# Patient Record
Sex: MALE | Race: WHITE | Employment: OTHER | URBAN - METROPOLITAN AREA
[De-identification: names, ages, dates, MRNs, and addresses within clinical notes are randomized per-mention and may not be internally consistent; named-entity substitution may affect disease eponyms.]

---

## 2017-08-04 ENCOUNTER — HOSPITAL ENCOUNTER (OUTPATIENT)
Dept: GENERAL RADIOLOGY | Age: 77
Discharge: HOME | End: 2017-08-04
Attending: INTERNAL MEDICINE
Payer: MEDICARE

## 2017-08-04 DIAGNOSIS — M24.559: ICD-10-CM

## 2017-08-04 PROCEDURE — 73521 X-RAY EXAM HIPS BI 2 VIEWS: CPT | Performed by: INTERNAL MEDICINE

## 2019-10-29 ENCOUNTER — IMMUNIZATION (OUTPATIENT)
Dept: FAMILY MEDICINE CLINIC | Facility: CLINIC | Age: 79
End: 2019-10-29
Payer: COMMERCIAL

## 2019-10-29 DIAGNOSIS — Z23 NEED FOR VACCINATION: ICD-10-CM

## 2019-10-29 PROCEDURE — G0008 ADMIN INFLUENZA VIRUS VAC: HCPCS | Performed by: NURSE PRACTITIONER

## 2019-10-29 PROCEDURE — 90662 IIV NO PRSV INCREASED AG IM: CPT | Performed by: NURSE PRACTITIONER

## 2020-05-21 ENCOUNTER — OFFICE VISIT (OUTPATIENT)
Dept: ELECTROPHYSIOLOGY | Facility: HOSPITAL | Age: 80
End: 2020-05-21
Attending: INTERNAL MEDICINE
Payer: MEDICARE

## 2020-05-21 DIAGNOSIS — G60.3 IDIOPATHIC PROGRESSIVE NEUROPATHY: ICD-10-CM

## 2020-05-21 DIAGNOSIS — R20.0 NUMBNESS AND TINGLING IN BOTH HANDS: Primary | ICD-10-CM

## 2020-05-21 DIAGNOSIS — R20.2 NUMBNESS AND TINGLING IN BOTH HANDS: Primary | ICD-10-CM

## 2020-05-21 DIAGNOSIS — G61.9 INFLAMMATORY POLYNEUROPATHY (HCC): ICD-10-CM

## 2020-05-21 PROCEDURE — 95910 NRV CNDJ TEST 7-8 STUDIES: CPT | Performed by: OTHER

## 2020-05-21 PROCEDURE — 95886 MUSC TEST DONE W/N TEST COMP: CPT | Performed by: OTHER

## 2020-05-22 NOTE — PROCEDURES
[address]      PATIENT'S NAME: Magaly Madonna   REFERRING PHYSICIAN: Jasvir Walsh M.D.    PATIENT ACCOUNT #: [de-identified] LOCATION: [facility]   MEDICAL RECORD #: [id number] DATE OF BIRTH: [birth year]

## 2020-06-24 ENCOUNTER — HOSPITAL ENCOUNTER (OUTPATIENT)
Dept: MRI IMAGING | Age: 80
Discharge: HOME | End: 2020-06-24
Attending: INTERNAL MEDICINE
Payer: MEDICARE

## 2020-06-24 DIAGNOSIS — M50.122 CERVICAL DISC DISORDER AT C5-C6 LEVEL WITH RADICULOPATHY: ICD-10-CM

## 2020-06-24 DIAGNOSIS — G60.3 IDIOPATHIC PROGRESSIVE NEUROPATHY: ICD-10-CM

## 2020-06-24 DIAGNOSIS — S16.1XXD STRAIN OF MUSCLE, FASCIA AND TENDON AT NECK LEVEL, SUBSEQUENT ENCOUNTER: ICD-10-CM

## 2020-06-24 DIAGNOSIS — G61.9 INFLAMMATORY POLYNEUROPATHY, UNSPECIFIED (HCC): ICD-10-CM

## 2020-06-24 DIAGNOSIS — M50.123 CERVICAL DISC DISORDER AT C6-C7 LEVEL WITH RADICULOPATHY: ICD-10-CM

## 2020-06-24 PROCEDURE — 72141 MRI NECK SPINE W/O DYE: CPT | Performed by: INTERNAL MEDICINE

## 2020-07-29 ENCOUNTER — OFFICE VISIT (OUTPATIENT)
Dept: SURGERY | Facility: CLINIC | Age: 80
End: 2020-07-29
Payer: COMMERCIAL

## 2020-07-29 VITALS
HEIGHT: 72 IN | HEART RATE: 92 BPM | WEIGHT: 190 LBS | BODY MASS INDEX: 25.73 KG/M2 | SYSTOLIC BLOOD PRESSURE: 130 MMHG | DIASTOLIC BLOOD PRESSURE: 70 MMHG

## 2020-07-29 DIAGNOSIS — M48.02 STENOSIS OF CERVICAL SPINE WITH MYELOPATHY (HCC): ICD-10-CM

## 2020-07-29 DIAGNOSIS — R20.0 NUMBNESS AND TINGLING IN BOTH HANDS: Primary | ICD-10-CM

## 2020-07-29 DIAGNOSIS — R20.2 NUMBNESS AND TINGLING IN BOTH HANDS: Primary | ICD-10-CM

## 2020-07-29 DIAGNOSIS — G99.2 STENOSIS OF CERVICAL SPINE WITH MYELOPATHY (HCC): ICD-10-CM

## 2020-07-29 PROCEDURE — 99204 OFFICE O/P NEW MOD 45 MIN: CPT | Performed by: PHYSICIAN ASSISTANT

## 2020-07-29 PROCEDURE — 3008F BODY MASS INDEX DOCD: CPT | Performed by: PHYSICIAN ASSISTANT

## 2020-07-29 PROCEDURE — 3075F SYST BP GE 130 - 139MM HG: CPT | Performed by: PHYSICIAN ASSISTANT

## 2020-07-29 PROCEDURE — 3078F DIAST BP <80 MM HG: CPT | Performed by: PHYSICIAN ASSISTANT

## 2020-07-29 RX ORDER — ATORVASTATIN CALCIUM 10 MG/1
10 TABLET, FILM COATED ORAL DAILY
COMMUNITY

## 2020-07-29 NOTE — PROGRESS NOTES
Location of Pain: pt states no pain. No issues with B/B. Reduction of mobility in right hand. Pt states numbness in finger in bilateral hands. Pt states right is worse than left. No issues with tingling.    Date Pain Began: 2 years ago       Work Related:

## 2020-07-29 NOTE — PROGRESS NOTES
Patient: Sushila Marrero  Medical Record Number: [id number]    PCP: Eva Romano MD    HISTORY OF CHIEF COMPLAINT:    Sushila Marrero is a 67year old male, who complains of 2 years h/o slowly progressive hand weakness and numbness.  He notes numbness to Number of children: Not on file      Years of education: Not on file      Highest education level: Not on file    Tobacco Use      Smoking status: Former Smoker      Smokeless tobacco: Never Used      Tobacco comment: quit in 1978    Substance and Sexual A osteophyte complex at C5-C6 is obliterating the thecal sac and causing marked severe spinal canal stenosis with flattening of the cervical spinal cord. There is associated myelomalacia within the cervical spinal cord at C5-C6.      2. Moderate spinal canal NT   ABDOMINAL EXAM: Abdomen is soft, non-distended.   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 Thank you very much.    Respectfully yours,    Janae KUMARI

## 2020-08-03 ENCOUNTER — TELEPHONE (OUTPATIENT)
Dept: SURGERY | Facility: CLINIC | Age: 80
End: 2020-08-03

## 2020-08-03 DIAGNOSIS — M47.12 CERVICAL SPONDYLOSIS WITH MYELOPATHY: Primary | ICD-10-CM

## 2020-08-03 NOTE — TELEPHONE ENCOUNTER
You are scheduled for 2 level ACDF on 8/20/20 with Dr. Ashu Farfan. Pre-op instructions discussed with patient and surgical packet provided:    · You will need to contact the Pre-admission department at [phone] to schedule your pre-op testing.   Leidy Lindsey Steps to bathing with Hibiclens  Do not use Hibiclens on your hair, face or private areas  Wash your hair and face as normal with your usual cleansers  Rinse your body well to remove any shampoo from your skin  Step away from the shower stream  Using a virtual spine class is available online at [email or web address]. Please call the Care Coordinator, Gee Fofana, with any questions, at [phone].     PCP  PA:  [facility] Medicare

## 2020-08-04 NOTE — TELEPHONE ENCOUNTER
Spoke with patient who returned my call; patient chose surgery date of 8/20/20. Pre-op instructions and information given to patient during call, patient not active on MyChart, therefore instructions were printed and mailed to patient's home.      PCP

## 2020-08-04 NOTE — TELEPHONE ENCOUNTER
Routed to Dr. Hartmann Peach to complete \"Panel 1\" surgery details (laterality), and inquire about provider's proposed surgery date.

## 2020-08-04 NOTE — TELEPHONE ENCOUNTER
Surgery order placed through [facility], surgery date placed on [facility] calendar and mailed to \"Dr. Wen Vega List\". Routed to PA team for insurance authorization initiation and to [facility] to arrange 2 week post op appointment.

## 2020-08-05 RX ORDER — CHOLECALCIFEROL (VITAMIN D3) 50 MCG
1 TABLET ORAL DAILY
COMMUNITY

## 2020-08-05 RX ORDER — ASPIRIN 81 MG/1
81 TABLET ORAL DAILY
Status: ON HOLD | COMMUNITY
End: 2021-01-15

## 2020-08-05 RX ORDER — OMEGA-3-ACID ETHYL ESTERS 1 G/1
1 CAPSULE, LIQUID FILLED ORAL DAILY
Status: ON HOLD | COMMUNITY
End: 2020-09-25

## 2020-08-06 ENCOUNTER — APPOINTMENT (OUTPATIENT)
Dept: LAB | Age: 80
End: 2020-08-06
Attending: NEUROLOGICAL SURGERY
Payer: MEDICARE

## 2020-08-06 ENCOUNTER — LABORATORY ENCOUNTER (OUTPATIENT)
Dept: LAB | Age: 80
End: 2020-08-06
Attending: NEUROLOGICAL SURGERY
Payer: MEDICARE

## 2020-08-06 DIAGNOSIS — M47.12 CERVICAL SPONDYLOSIS WITH MYELOPATHY: ICD-10-CM

## 2020-08-06 LAB
ALBUMIN SERPL-MCNC: 3.6 G/DL (ref 3.4–5)
ALBUMIN/GLOB SERPL: 1 {RATIO} (ref 1–2)
ALP LIVER SERPL-CCNC: 81 U/L (ref 45–117)
ALT SERPL-CCNC: 28 U/L (ref 16–61)
ANION GAP SERPL CALC-SCNC: 1 MMOL/L (ref 0–18)
ANTIBODY SCREEN: NEGATIVE
APTT PPP: 29.5 SECONDS (ref 25.4–36.1)
AST SERPL-CCNC: 21 U/L (ref 15–37)
BASOPHILS # BLD AUTO: 0.12 X10(3) UL (ref 0–0.2)
BASOPHILS NFR BLD AUTO: 1.9 %
BILIRUB SERPL-MCNC: 1 MG/DL (ref 0.1–2)
BUN BLD-MCNC: 12 MG/DL (ref 7–18)
BUN/CREAT SERPL: 13.3 (ref 10–20)
CALCIUM BLD-MCNC: 9.3 MG/DL (ref 8.5–10.1)
CHLORIDE SERPL-SCNC: 109 MMOL/L (ref 98–112)
CO2 SERPL-SCNC: 30 MMOL/L (ref 21–32)
CREAT BLD-MCNC: 0.9 MG/DL (ref 0.7–1.3)
DEPRECATED RDW RBC AUTO: 48.3 FL (ref 35.1–46.3)
EOSINOPHIL # BLD AUTO: 0.17 X10(3) UL (ref 0–0.7)
EOSINOPHIL NFR BLD AUTO: 2.7 %
ERYTHROCYTE [DISTWIDTH] IN BLOOD BY AUTOMATED COUNT: 13.6 % (ref 11–15)
GLOBULIN PLAS-MCNC: 3.6 G/DL (ref 2.8–4.4)
GLUCOSE BLD-MCNC: 100 MG/DL (ref 70–99)
HCT VFR BLD AUTO: 45.1 % (ref 39–53)
HGB BLD-MCNC: 15 G/DL (ref 13–17.5)
IMM GRANULOCYTES # BLD AUTO: 0.02 X10(3) UL (ref 0–1)
IMM GRANULOCYTES NFR BLD: 0.3 %
INR BLD: 0.96 (ref 0.89–1.11)
LYMPHOCYTES # BLD AUTO: 2.09 X10(3) UL (ref 1–4)
LYMPHOCYTES NFR BLD AUTO: 33.3 %
M PROTEIN MFR SERPL ELPH: 7.2 G/DL (ref 6.4–8.2)
MCH RBC QN AUTO: 31.8 PG (ref 26–34)
MCHC RBC AUTO-ENTMCNC: 33.3 G/DL (ref 31–37)
MCV RBC AUTO: 95.6 FL (ref 80–100)
MONOCYTES # BLD AUTO: 0.69 X10(3) UL (ref 0.1–1)
MONOCYTES NFR BLD AUTO: 11 %
NEUTROPHILS # BLD AUTO: 3.18 X10 (3) UL (ref 1.5–7.7)
NEUTROPHILS # BLD AUTO: 3.18 X10(3) UL (ref 1.5–7.7)
NEUTROPHILS NFR BLD AUTO: 50.8 %
OSMOLALITY SERPL CALC.SUM OF ELEC: 290 MOSM/KG (ref 275–295)
PATIENT FASTING Y/N/NP: NO
PLATELET # BLD AUTO: 171 10(3)UL (ref 150–450)
POTASSIUM SERPL-SCNC: 4.4 MMOL/L (ref 3.5–5.1)
PSA SERPL DL<=0.01 NG/ML-MCNC: 13.1 SECONDS (ref 12.4–14.6)
RBC # BLD AUTO: 4.72 X10(6)UL (ref 3.8–5.8)
RH BLOOD TYPE: POSITIVE
SODIUM SERPL-SCNC: 140 MMOL/L (ref 136–145)
WBC # BLD AUTO: 6.3 X10(3) UL (ref 4–11)

## 2020-08-06 PROCEDURE — 86901 BLOOD TYPING SEROLOGIC RH(D): CPT

## 2020-08-06 PROCEDURE — 86850 RBC ANTIBODY SCREEN: CPT

## 2020-08-06 PROCEDURE — 93010 ELECTROCARDIOGRAM REPORT: CPT | Performed by: INTERNAL MEDICINE

## 2020-08-06 PROCEDURE — 87147 CULTURE TYPE IMMUNOLOGIC: CPT

## 2020-08-06 PROCEDURE — 36415 COLL VENOUS BLD VENIPUNCTURE: CPT

## 2020-08-06 PROCEDURE — 93005 ELECTROCARDIOGRAM TRACING: CPT

## 2020-08-06 PROCEDURE — 85610 PROTHROMBIN TIME: CPT

## 2020-08-06 PROCEDURE — 80053 COMPREHEN METABOLIC PANEL: CPT

## 2020-08-06 PROCEDURE — 87081 CULTURE SCREEN ONLY: CPT

## 2020-08-06 PROCEDURE — 85730 THROMBOPLASTIN TIME PARTIAL: CPT

## 2020-08-06 PROCEDURE — 86900 BLOOD TYPING SEROLOGIC ABO: CPT

## 2020-08-06 PROCEDURE — 85025 COMPLETE CBC W/AUTO DIFF WBC: CPT

## 2020-08-06 NOTE — TELEPHONE ENCOUNTER
Prior Authorization for outpatient surgery initiated with Riki Vinson at [place] [address]  Requesting coverage for: ACDF C4-5,C5-6  Date of Service: 8/20/2020   Inpatient days requested:0 - outpatient  CPT codes: 53060,83395,60385,24222,00310 - per Riki Vinson only 25

## 2020-08-07 LAB
ATRIAL RATE: 64 BPM
P AXIS: 4 DEGREES
P-R INTERVAL: 140 MS
Q-T INTERVAL: 422 MS
QRS DURATION: 114 MS
QTC CALCULATION (BEZET): 435 MS
R AXIS: -58 DEGREES
T AXIS: 59 DEGREES
VENTRICULAR RATE: 64 BPM

## 2020-08-13 ENCOUNTER — TELEPHONE (OUTPATIENT)
Dept: SURGERY | Facility: CLINIC | Age: 80
End: 2020-08-13

## 2020-08-13 NOTE — TELEPHONE ENCOUNTER
Pt thought that because of his nose culture being positive someone was going to order some type of ointment or medication. Please call.

## 2020-08-13 NOTE — TELEPHONE ENCOUNTER
Called patient to explain we do not have to treat for positive MSSA.   Patient happy for the phone call

## 2020-08-17 ENCOUNTER — LAB ENCOUNTER (OUTPATIENT)
Dept: LAB | Facility: HOSPITAL | Age: 80
End: 2020-08-17
Attending: NEUROLOGICAL SURGERY
Payer: MEDICARE

## 2020-08-17 DIAGNOSIS — M47.12 CERVICAL SPONDYLOSIS WITH MYELOPATHY: ICD-10-CM

## 2020-08-17 NOTE — TELEPHONE ENCOUNTER
Spoke to Dr Derik Bautista office nurse to inquire about pt's pre op clearance. Office requested pre op lab results be sent to their office at [phone]. Per RN pt may need an apt to obtain clearance for scheduled surgery on 8-20.   Faxed lab results to Dr Eugene Talbert

## 2020-08-18 LAB — SARS-COV-2 RNA RESP QL NAA+PROBE: NOT DETECTED

## 2020-08-18 NOTE — TELEPHONE ENCOUNTER
Prior authorization request completed for: ACDF C4-5,C5-6  Authorization #[id number] - outpatient  Authorization dates: 8/20/20-11/18/20  CPT codes approved: 38780,11970,40274,12576,81649  Number of visits/dates of service approved: 1  Physician: Dr Abimael Pritchard

## 2020-08-18 NOTE — TELEPHONE ENCOUNTER
Spoke to PCP Lior's office. Patient is scheduled to come in tomorrow, 8-19 for pre-op visit. Will await clearance notification. COVID pending.     EKG results from 8-7-2020  \"Normal sinus rhythm  Left anterior fascicular block  Minimal voltage criter

## 2020-08-19 ENCOUNTER — TELEPHONE (OUTPATIENT)
Dept: SURGERY | Facility: CLINIC | Age: 80
End: 2020-08-19

## 2020-08-20 ENCOUNTER — OFFICE VISIT (OUTPATIENT)
Dept: CARDIOLOGY | Age: 80
End: 2020-08-20

## 2020-08-20 ENCOUNTER — HOSPITAL ENCOUNTER (OUTPATIENT)
Facility: HOSPITAL | Age: 80
Setting detail: HOSPITAL OUTPATIENT SURGERY
Discharge: HOME | End: 2020-08-20
Attending: NEUROLOGICAL SURGERY | Admitting: NEUROLOGICAL SURGERY
Payer: MEDICARE

## 2020-08-20 ENCOUNTER — APPOINTMENT (OUTPATIENT)
Dept: GENERAL RADIOLOGY | Facility: HOSPITAL | Age: 80
End: 2020-08-20
Attending: NEUROLOGICAL SURGERY
Payer: MEDICARE

## 2020-08-20 VITALS
WEIGHT: 190 LBS | DIASTOLIC BLOOD PRESSURE: 88 MMHG | TEMPERATURE: 99 F | HEIGHT: 72 IN | HEART RATE: 66 BPM | RESPIRATION RATE: 18 BRPM | BODY MASS INDEX: 25.73 KG/M2 | OXYGEN SATURATION: 99 % | SYSTOLIC BLOOD PRESSURE: 109 MMHG

## 2020-08-20 VITALS
HEART RATE: 70 BPM | DIASTOLIC BLOOD PRESSURE: 68 MMHG | SYSTOLIC BLOOD PRESSURE: 138 MMHG | RESPIRATION RATE: 16 BRPM | BODY MASS INDEX: 25.87 KG/M2 | WEIGHT: 191 LBS | HEIGHT: 72 IN

## 2020-08-20 DIAGNOSIS — Z01.818 PRE-OP EVALUATION: ICD-10-CM

## 2020-08-20 DIAGNOSIS — M47.12 CERVICAL SPONDYLOSIS WITH MYELOPATHY: Primary | ICD-10-CM

## 2020-08-20 DIAGNOSIS — R94.31 ABNORMAL ELECTROCARDIOGRAM (ECG) (EKG): Primary | ICD-10-CM

## 2020-08-20 PROCEDURE — 99204 OFFICE O/P NEW MOD 45 MIN: CPT | Performed by: INTERNAL MEDICINE

## 2020-08-20 RX ORDER — SODIUM CHLORIDE, SODIUM LACTATE, POTASSIUM CHLORIDE, CALCIUM CHLORIDE 600; 310; 30; 20 MG/100ML; MG/100ML; MG/100ML; MG/100ML
INJECTION, SOLUTION INTRAVENOUS CONTINUOUS
Status: DISCONTINUED | OUTPATIENT
Start: 2020-08-20 | End: 2020-08-20

## 2020-08-20 RX ORDER — CEFAZOLIN SODIUM/WATER 2 G/20 ML
2 SYRINGE (ML) INTRAVENOUS ONCE
Status: DISCONTINUED | OUTPATIENT
Start: 2020-08-20 | End: 2020-08-20

## 2020-08-20 RX ORDER — ATORVASTATIN CALCIUM 10 MG/1
TABLET, FILM COATED ORAL
COMMUNITY
Start: 2020-06-03

## 2020-08-20 RX ORDER — ACETAMINOPHEN 500 MG
1000 TABLET ORAL ONCE
Status: DISCONTINUED | OUTPATIENT
Start: 2020-08-20 | End: 2020-08-20

## 2020-08-20 SDOH — HEALTH STABILITY: PHYSICAL HEALTH: ON AVERAGE, HOW MANY DAYS PER WEEK DO YOU ENGAGE IN MODERATE TO STRENUOUS EXERCISE (LIKE A BRISK WALK)?: 0 DAYS

## 2020-08-20 ASSESSMENT — ENCOUNTER SYMPTOMS
HEMATOCHEZIA: 0
SUSPICIOUS LESIONS: 0
ENDOCRINE NEGATIVE: 1
FEVER: 0
SYNCOPE: 0
WEIGHT LOSS: 0
LIGHT-HEADEDNESS: 0
CHILLS: 0
SHORTNESS OF BREATH: 0
WEIGHT GAIN: 0
COUGH: 0
DIZZINESS: 0
ABDOMINAL PAIN: 0
PSYCHIATRIC NEGATIVE: 1
HEMOPTYSIS: 0
BRUISES/BLEEDS EASILY: 0

## 2020-08-20 ASSESSMENT — PATIENT HEALTH QUESTIONNAIRE - PHQ9
CLINICAL INTERPRETATION OF PHQ2 SCORE: NO FURTHER SCREENING NEEDED
2. FEELING DOWN, DEPRESSED OR HOPELESS: NOT AT ALL
SUM OF ALL RESPONSES TO PHQ9 QUESTIONS 1 AND 2: 0
CLINICAL INTERPRETATION OF PHQ9 SCORE: NO FURTHER SCREENING NEEDED
SUM OF ALL RESPONSES TO PHQ9 QUESTIONS 1 AND 2: 0
1. LITTLE INTEREST OR PLEASURE IN DOING THINGS: NOT AT ALL

## 2020-08-20 NOTE — H&P
[facility]  Neurological Surgery History and Physical    Akash Chavarria, 67year old male Patient Status:  Outpatient in a Bed    1940 MRN [id number]   Location [address] [place] [address] Attending Jaylene Rooney MD intraoperative cholangiogram             Family History   Problem Relation Age of Onset   • Other (skin cancer [Other]) Father         Social History    Socioeconomic History      Marital status: Single      Spouse name: Not on file      Number of children stenosis. C7-T1:  Mild central disc bulge without spinal canal stenosis.     CRANIOCERVICAL AREA:  Normal foramen magnum with no Chiari malformation.    PARASPINAL AREA:  Normal with no visible mass.          CONCLUSION:       1.  Large posterior disc oste EHL                     5/5 4/5                                     Patella                3+/4       3+/4  DF                       5/5 4/5                                     Achilles               3+/4       3 he has had symptoms for 2 years, surgery would be to arrest progression of his symptoms but would not guarantee resolution or significant improvement. He understands.  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

## 2020-08-21 ENCOUNTER — TELEPHONE (OUTPATIENT)
Dept: CARDIOLOGY | Age: 80
End: 2020-08-21

## 2020-08-21 ENCOUNTER — ANCILLARY PROCEDURE (OUTPATIENT)
Dept: CARDIOLOGY | Age: 80
End: 2020-08-21
Attending: INTERNAL MEDICINE

## 2020-08-21 VITALS — BODY MASS INDEX: 25.87 KG/M2 | WEIGHT: 191 LBS | HEIGHT: 72 IN

## 2020-08-21 DIAGNOSIS — Z01.818 PRE-OP EVALUATION: ICD-10-CM

## 2020-08-21 DIAGNOSIS — R94.31 ABNORMAL ELECTROCARDIOGRAM (ECG) (EKG): ICD-10-CM

## 2020-08-21 LAB
HEART RATE RESERVE PREDICTED: 49.3 BPM
LV EF: 54 %
PEAK HR ACHIEVED: 72 BPM
RESTING HR ACHIEVED: 61 BPM
STRESS BASELINE BP: NORMAL MMHG
STRESS PERCENT HR: 51 %
STRESS POST EXERCISE DUR MIN: 1 MIN
STRESS POST PEAK BP: NORMAL MMHG
STRESS TARGET HR: 142 BPM

## 2020-08-21 PROCEDURE — 78452 HT MUSCLE IMAGE SPECT MULT: CPT | Performed by: INTERNAL MEDICINE

## 2020-08-21 PROCEDURE — 93015 CV STRESS TEST SUPVJ I&R: CPT | Performed by: INTERNAL MEDICINE

## 2020-08-21 PROCEDURE — X1094 NO CHARGE VISIT: HCPCS | Performed by: INTERNAL MEDICINE

## 2020-08-21 PROCEDURE — A9502 TC99M TETROFOSMIN: HCPCS | Performed by: INTERNAL MEDICINE

## 2020-08-21 RX ORDER — REGADENOSON 0.08 MG/ML
0.4 INJECTION, SOLUTION INTRAVENOUS ONCE
Status: COMPLETED | OUTPATIENT
Start: 2020-08-21 | End: 2020-08-21

## 2020-08-21 RX ADMIN — REGADENOSON 0.4 MG: 0.08 INJECTION, SOLUTION INTRAVENOUS at 08:40

## 2020-08-21 ASSESSMENT — EXERCISE STRESS TEST
PEAK_RPP: 8960
PEAK_RPP: 9240
PEAK_RPP: 9230
PEAK_RPP: 8520
PEAK_HR: 70
PEAK_BP: 128/60
PEAK_BP: 130/60
PEAK_HR: 61
PEAK_HR: 77
PEAK_BP: 120/60
PEAK_HR: 71
PEAK_BP: 148/64
PEAK_HR: 71
COMMENTS: REGADENOSON INJECTED
STAGE_CATEGORIES: RESTING
STAGE_CATEGORIES: RECOVERY 0
STAGE_CATEGORIES: RECOVERY 1
PEAK_BP: 120/60
PEAK_RPP: 9028
STAGE_CATEGORIES: 1
STAGE_CATEGORIES: RECOVERY 2
STOPPAGE_REASON: PROTOCOL COMPLETE

## 2020-08-24 ENCOUNTER — TELEPHONE (OUTPATIENT)
Dept: SURGERY | Facility: CLINIC | Age: 80
End: 2020-08-24

## 2020-08-25 ENCOUNTER — TELEPHONE (OUTPATIENT)
Dept: CARDIOLOGY | Age: 80
End: 2020-08-25

## 2020-08-25 NOTE — TELEPHONE ENCOUNTER
Spoke with cards-'s office and informed them that stress test was received yesterday, but we are needing a note giving cardiology clearance so surgery can be scheduled.     A message is being sent back to 's clinical staff to a

## 2020-08-25 NOTE — TELEPHONE ENCOUNTER
Stress test results received from [facility]. Whether the patient has been cleared or not is not included in the fax received. Will send another fax requesting cardiac clearance to Herberth Griffiths at [phone]. Awaiting response.

## 2020-08-26 NOTE — TELEPHONE ENCOUNTER
Received fax from cardiologist Rodena Rinne stating \"patient may proceed with surgery at an acceptable risk\". Faxed note to Dr Neela Martinez office PCP requesting clearance. Confirmation received.

## 2020-08-27 ENCOUNTER — TELEPHONE (OUTPATIENT)
Dept: SURGERY | Facility: CLINIC | Age: 80
End: 2020-08-27

## 2020-08-27 DIAGNOSIS — M47.12 CERVICAL SPONDYLOSIS WITH MYELOPATHY AND RADICULOPATHY: Primary | ICD-10-CM

## 2020-08-27 DIAGNOSIS — M47.22 CERVICAL SPONDYLOSIS WITH MYELOPATHY AND RADICULOPATHY: Primary | ICD-10-CM

## 2020-08-27 NOTE — TELEPHONE ENCOUNTER
Awaiting final PCP clearance. Will also need  to place a new surgery order as previous surgery date was cancelled.

## 2020-08-28 ENCOUNTER — TELEPHONE (OUTPATIENT)
Dept: SURGERY | Facility: CLINIC | Age: 80
End: 2020-08-28

## 2020-09-01 NOTE — TELEPHONE ENCOUNTER
Received another copy of office visit notes from patient's last apt with Dr Simeon Peabody on 8-19-20. Faxed back with letter again requesting if patient can be cleared for surgery since he was cleared by cardiology. 2nd request.  Confirmation received.

## 2020-09-02 ENCOUNTER — TELEPHONE (OUTPATIENT)
Dept: SURGERY | Facility: CLINIC | Age: 80
End: 2020-09-02

## 2020-09-02 NOTE — TELEPHONE ENCOUNTER
Spoke with Alexandria Sommer at [place] [address]  Reference #:[id number]   Time:06:15    Confirmed with Alexandria Sommer that Authorization date range is still valid.  (See Below)      Prior authorization request completed for: ACDF C4-5,C5-6  Authorization #[id number] - outpatient  Auth

## 2020-09-02 NOTE — TELEPHONE ENCOUNTER
Spoke to patient to reschedule surgery. Surgery scheduled for 9-24. Discussed pre-op instructions again just to confirm he knew how to prep--NPO after 11pm on 9-23, gatorade and tylenol instructions, hibiclens. Patient verbalized understanding.      PA

## 2020-09-03 ENCOUNTER — TELEPHONE (OUTPATIENT)
Dept: SURGERY | Facility: CLINIC | Age: 80
End: 2020-09-03

## 2020-09-03 DIAGNOSIS — M47.812 CERVICAL SPONDYLOSIS: Primary | ICD-10-CM

## 2020-09-03 NOTE — PAT NURSING NOTE
Called surgeons office and s/w Priya shaw MD is requesting PTT and will fax order and also stress test to us.

## 2020-09-03 NOTE — TELEPHONE ENCOUNTER
PAT requested order for PTT and stress test results. Stress test not scanned in yet. Called Dr Self Class office, will send results via fax. Awaiting fax.   PTT order placed as case change request.

## 2020-09-11 NOTE — TELEPHONE ENCOUNTER
Received office visit notes from Dr Renae Neely on 8-20. Stress test results not included as requested. Called office again and requested stress test results be faxed to us. Awaiting fax.

## 2020-09-14 ENCOUNTER — ANESTHESIA EVENT (OUTPATIENT)
Dept: SURGERY | Facility: HOSPITAL | Age: 80
End: 2020-09-14
Payer: MEDICARE

## 2020-09-21 ENCOUNTER — LAB ENCOUNTER (OUTPATIENT)
Dept: LAB | Age: 80
End: 2020-09-21
Attending: NEUROLOGICAL SURGERY
Payer: MEDICARE

## 2020-09-21 DIAGNOSIS — M47.12 SPONDYLOSIS OF CERVICAL SPINE WITH MYELOPATHY AND RADICULOPATHY: ICD-10-CM

## 2020-09-21 DIAGNOSIS — M47.22 SPONDYLOSIS OF CERVICAL SPINE WITH MYELOPATHY AND RADICULOPATHY: ICD-10-CM

## 2020-09-21 LAB
ANTIBODY SCREEN: NEGATIVE
APTT PPP: 32 SECONDS (ref 25.4–36.1)
INR BLD: 0.96 (ref 0.89–1.11)
PSA SERPL DL<=0.01 NG/ML-MCNC: 13.1 SECONDS (ref 12.4–14.6)
RH BLOOD TYPE: POSITIVE

## 2020-09-21 PROCEDURE — 36415 COLL VENOUS BLD VENIPUNCTURE: CPT

## 2020-09-21 PROCEDURE — 86900 BLOOD TYPING SEROLOGIC ABO: CPT

## 2020-09-21 PROCEDURE — 85730 THROMBOPLASTIN TIME PARTIAL: CPT

## 2020-09-21 PROCEDURE — 86850 RBC ANTIBODY SCREEN: CPT

## 2020-09-21 PROCEDURE — 87081 CULTURE SCREEN ONLY: CPT

## 2020-09-21 PROCEDURE — 86901 BLOOD TYPING SEROLOGIC RH(D): CPT

## 2020-09-21 PROCEDURE — 85610 PROTHROMBIN TIME: CPT

## 2020-09-23 LAB — SARS-COV-2 RNA RESP QL NAA+PROBE: NOT DETECTED

## 2020-09-24 ENCOUNTER — ANESTHESIA (OUTPATIENT)
Dept: SURGERY | Facility: HOSPITAL | Age: 80
End: 2020-09-24
Payer: MEDICARE

## 2020-09-24 ENCOUNTER — HOSPITAL ENCOUNTER (OUTPATIENT)
Facility: HOSPITAL | Age: 80
Discharge: HOME | End: 2020-09-25
Attending: NEUROLOGICAL SURGERY | Admitting: NEUROLOGICAL SURGERY
Payer: MEDICARE

## 2020-09-24 ENCOUNTER — APPOINTMENT (OUTPATIENT)
Dept: GENERAL RADIOLOGY | Facility: HOSPITAL | Age: 80
End: 2020-09-24
Attending: NEUROLOGICAL SURGERY
Payer: MEDICARE

## 2020-09-24 DIAGNOSIS — M47.22 CERVICAL SPONDYLOSIS WITH MYELOPATHY AND RADICULOPATHY: ICD-10-CM

## 2020-09-24 DIAGNOSIS — M47.22 SPONDYLOSIS OF CERVICAL SPINE WITH MYELOPATHY AND RADICULOPATHY: Primary | ICD-10-CM

## 2020-09-24 DIAGNOSIS — M47.12 SPONDYLOSIS OF CERVICAL SPINE WITH MYELOPATHY AND RADICULOPATHY: Primary | ICD-10-CM

## 2020-09-24 DIAGNOSIS — M47.12 CERVICAL SPONDYLOSIS WITH MYELOPATHY AND RADICULOPATHY: ICD-10-CM

## 2020-09-24 PROCEDURE — 95939 C MOTOR EVOKED UPR&LWR LIMBS: CPT | Performed by: NEUROLOGICAL SURGERY

## 2020-09-24 PROCEDURE — 95938 SOMATOSENSORY TESTING: CPT | Performed by: NEUROLOGICAL SURGERY

## 2020-09-24 PROCEDURE — 95940 IONM IN OPERATNG ROOM 15 MIN: CPT | Performed by: NEUROLOGICAL SURGERY

## 2020-09-24 PROCEDURE — 0RB30ZZ EXCISION OF CERVICAL VERTEBRAL DISC, OPEN APPROACH: ICD-10-PCS | Performed by: NEUROLOGICAL SURGERY

## 2020-09-24 PROCEDURE — 96375 TX/PRO/DX INJ NEW DRUG ADDON: CPT

## 2020-09-24 PROCEDURE — 0RG20A0 FUSION OF 2 OR MORE CERVICAL VERTEBRAL JOINTS WITH INTERBODY FUSION DEVICE, ANTERIOR APPROACH, ANTERIOR COLUMN, OPEN APPROACH: ICD-10-PCS | Performed by: NEUROLOGICAL SURGERY

## 2020-09-24 PROCEDURE — 95870 NDL EMG LMTD STD MUSC 1 XTR: CPT | Performed by: NEUROLOGICAL SURGERY

## 2020-09-24 PROCEDURE — 4A10X4G MONITORING OF CENTRAL NERVOUS ELECTRICAL ACTIVITY, INTRAOPERATIVE, EXTERNAL APPROACH: ICD-10-PCS | Performed by: NEUROLOGICAL SURGERY

## 2020-09-24 PROCEDURE — 76000 FLUOROSCOPY <1 HR PHYS/QHP: CPT | Performed by: NEUROLOGICAL SURGERY

## 2020-09-24 DEVICE — GRAFT BONE VIVIGEN SM CRYO: Type: IMPLANTABLE DEVICE | Site: NECK | Status: FUNCTIONAL

## 2020-09-24 DEVICE — UNIPLATE 2 ANTERIOR CERVICAL PLATE SYSTEM TWO LEVEL PLATE 30MM
Type: IMPLANTABLE DEVICE | Site: NECK | Status: FUNCTIONAL
Brand: UNIPLATE

## 2020-09-24 DEVICE — UNIPLATE ANTERIOR CERVICAL PLATE SYSTEM SCREW, SELF-TAPPING 4.6 X 14MM
Type: IMPLANTABLE DEVICE | Site: NECK | Status: FUNCTIONAL
Brand: UNIPLATE

## 2020-09-24 RX ORDER — HYDROMORPHONE HYDROCHLORIDE 1 MG/ML
0.4 INJECTION, SOLUTION INTRAMUSCULAR; INTRAVENOUS; SUBCUTANEOUS EVERY 5 MIN PRN
Status: DISCONTINUED | OUTPATIENT
Start: 2020-09-24 | End: 2020-09-24 | Stop reason: HOSPADM

## 2020-09-24 RX ORDER — ONDANSETRON 2 MG/ML
4 INJECTION INTRAMUSCULAR; INTRAVENOUS AS NEEDED
Status: DISCONTINUED | OUTPATIENT
Start: 2020-09-24 | End: 2020-09-24 | Stop reason: HOSPADM

## 2020-09-24 RX ORDER — MEPERIDINE HYDROCHLORIDE 25 MG/ML
12.5 INJECTION INTRAMUSCULAR; INTRAVENOUS; SUBCUTANEOUS AS NEEDED
Status: DISCONTINUED | OUTPATIENT
Start: 2020-09-24 | End: 2020-09-24 | Stop reason: HOSPADM

## 2020-09-24 RX ORDER — DIPHENHYDRAMINE HYDROCHLORIDE 50 MG/ML
25 INJECTION INTRAMUSCULAR; INTRAVENOUS EVERY 4 HOURS PRN
Status: DISCONTINUED | OUTPATIENT
Start: 2020-09-24 | End: 2020-09-25

## 2020-09-24 RX ORDER — DEXTROSE, SODIUM CHLORIDE, AND POTASSIUM CHLORIDE 5; .45; .15 G/100ML; G/100ML; G/100ML
INJECTION INTRAVENOUS
Status: COMPLETED
Start: 2020-09-24 | End: 2020-09-24

## 2020-09-24 RX ORDER — HYDROMORPHONE HYDROCHLORIDE 1 MG/ML
INJECTION, SOLUTION INTRAMUSCULAR; INTRAVENOUS; SUBCUTANEOUS
Status: COMPLETED
Start: 2020-09-24 | End: 2020-09-24

## 2020-09-24 RX ORDER — HYDROCODONE BITARTRATE AND ACETAMINOPHEN 10; 325 MG/1; MG/1
2 TABLET ORAL EVERY 4 HOURS PRN
Status: DISCONTINUED | OUTPATIENT
Start: 2020-09-24 | End: 2020-09-25

## 2020-09-24 RX ORDER — DEXAMETHASONE SODIUM PHOSPHATE 10 MG/ML
10 INJECTION, SOLUTION INTRAMUSCULAR; INTRAVENOUS EVERY 6 HOURS
Status: COMPLETED | OUTPATIENT
Start: 2020-09-24 | End: 2020-09-25

## 2020-09-24 RX ORDER — ACETAMINOPHEN 10 MG/ML
INJECTION, SOLUTION INTRAVENOUS AS NEEDED
Status: DISCONTINUED | OUTPATIENT
Start: 2020-09-24 | End: 2020-09-24 | Stop reason: SURG

## 2020-09-24 RX ORDER — POLYETHYLENE GLYCOL 3350 17 G/17G
17 POWDER, FOR SOLUTION ORAL DAILY PRN
Status: DISCONTINUED | OUTPATIENT
Start: 2020-09-24 | End: 2020-09-25

## 2020-09-24 RX ORDER — OMEGA-3-ACID ETHYL ESTERS 1 G/1
1 CAPSULE, LIQUID FILLED ORAL DAILY
Status: DISCONTINUED | OUTPATIENT
Start: 2020-09-25 | End: 2020-09-24 | Stop reason: ALTCHOICE

## 2020-09-24 RX ORDER — CHOLECALCIFEROL (VITAMIN D3) 50 MCG
1 TABLET ORAL DAILY
Status: DISCONTINUED | OUTPATIENT
Start: 2020-09-24 | End: 2020-09-24

## 2020-09-24 RX ORDER — DIPHENHYDRAMINE HYDROCHLORIDE 50 MG/ML
12.5 INJECTION INTRAMUSCULAR; INTRAVENOUS AS NEEDED
Status: DISCONTINUED | OUTPATIENT
Start: 2020-09-24 | End: 2020-09-24 | Stop reason: HOSPADM

## 2020-09-24 RX ORDER — ATORVASTATIN CALCIUM 10 MG/1
10 TABLET, FILM COATED ORAL DAILY
Status: DISCONTINUED | OUTPATIENT
Start: 2020-09-24 | End: 2020-09-24

## 2020-09-24 RX ORDER — MORPHINE SULFATE 2 MG/ML
1 INJECTION, SOLUTION INTRAMUSCULAR; INTRAVENOUS EVERY 2 HOUR PRN
Status: DISCONTINUED | OUTPATIENT
Start: 2020-09-24 | End: 2020-09-25

## 2020-09-24 RX ORDER — ROCURONIUM BROMIDE 10 MG/ML
INJECTION, SOLUTION INTRAVENOUS AS NEEDED
Status: DISCONTINUED | OUTPATIENT
Start: 2020-09-24 | End: 2020-09-24 | Stop reason: SURG

## 2020-09-24 RX ORDER — DEXTROSE, SODIUM CHLORIDE, AND POTASSIUM CHLORIDE 5; .45; .15 G/100ML; G/100ML; G/100ML
INJECTION INTRAVENOUS CONTINUOUS
Status: DISCONTINUED | OUTPATIENT
Start: 2020-09-24 | End: 2020-09-25

## 2020-09-24 RX ORDER — NALOXONE HYDROCHLORIDE 0.4 MG/ML
80 INJECTION, SOLUTION INTRAMUSCULAR; INTRAVENOUS; SUBCUTANEOUS AS NEEDED
Status: DISCONTINUED | OUTPATIENT
Start: 2020-09-24 | End: 2020-09-24 | Stop reason: HOSPADM

## 2020-09-24 RX ORDER — HYDROCODONE BITARTRATE AND ACETAMINOPHEN 5; 325 MG/1; MG/1
1 TABLET ORAL AS NEEDED
Status: DISCONTINUED | OUTPATIENT
Start: 2020-09-24 | End: 2020-09-24 | Stop reason: HOSPADM

## 2020-09-24 RX ORDER — PROCHLORPERAZINE EDISYLATE 5 MG/ML
10 INJECTION INTRAMUSCULAR; INTRAVENOUS EVERY 6 HOURS PRN
Status: DISCONTINUED | OUTPATIENT
Start: 2020-09-24 | End: 2020-09-25

## 2020-09-24 RX ORDER — DEXAMETHASONE SODIUM PHOSPHATE 4 MG/ML
4 VIAL (ML) INJECTION EVERY 6 HOURS
Status: DISCONTINUED | OUTPATIENT
Start: 2020-09-24 | End: 2020-09-24

## 2020-09-24 RX ORDER — HYDROCODONE BITARTRATE AND ACETAMINOPHEN 10; 325 MG/1; MG/1
1 TABLET ORAL EVERY 4 HOURS PRN
Status: DISCONTINUED | OUTPATIENT
Start: 2020-09-24 | End: 2020-09-25

## 2020-09-24 RX ORDER — METOCLOPRAMIDE HYDROCHLORIDE 5 MG/ML
10 INJECTION INTRAMUSCULAR; INTRAVENOUS AS NEEDED
Status: DISCONTINUED | OUTPATIENT
Start: 2020-09-24 | End: 2020-09-24 | Stop reason: HOSPADM

## 2020-09-24 RX ORDER — SODIUM PHOSPHATE, DIBASIC AND SODIUM PHOSPHATE, MONOBASIC 7; 19 G/133ML; G/133ML
1 ENEMA RECTAL ONCE AS NEEDED
Status: DISCONTINUED | OUTPATIENT
Start: 2020-09-24 | End: 2020-09-25

## 2020-09-24 RX ORDER — SENNOSIDES 8.6 MG
17.2 TABLET ORAL NIGHTLY
Status: DISCONTINUED | OUTPATIENT
Start: 2020-09-24 | End: 2020-09-25

## 2020-09-24 RX ORDER — HYDROCODONE BITARTRATE AND ACETAMINOPHEN 5; 325 MG/1; MG/1
2 TABLET ORAL AS NEEDED
Status: DISCONTINUED | OUTPATIENT
Start: 2020-09-24 | End: 2020-09-24 | Stop reason: HOSPADM

## 2020-09-24 RX ORDER — DIAZEPAM 5 MG/ML
5 INJECTION, SOLUTION INTRAMUSCULAR; INTRAVENOUS EVERY 8 HOURS PRN
Status: DISCONTINUED | OUTPATIENT
Start: 2020-09-24 | End: 2020-09-25

## 2020-09-24 RX ORDER — BUPIVACAINE HYDROCHLORIDE AND EPINEPHRINE 5; 5 MG/ML; UG/ML
INJECTION, SOLUTION EPIDURAL; INTRACAUDAL; PERINEURAL AS NEEDED
Status: DISCONTINUED | OUTPATIENT
Start: 2020-09-24 | End: 2020-09-24 | Stop reason: HOSPADM

## 2020-09-24 RX ORDER — LIDOCAINE HYDROCHLORIDE 40 MG/ML
INJECTION, SOLUTION RETROBULBAR; TOPICAL AS NEEDED
Status: DISCONTINUED | OUTPATIENT
Start: 2020-09-24 | End: 2020-09-24 | Stop reason: SURG

## 2020-09-24 RX ORDER — ATORVASTATIN CALCIUM 10 MG/1
10 TABLET, FILM COATED ORAL DAILY
Status: DISCONTINUED | OUTPATIENT
Start: 2020-09-25 | End: 2020-09-25

## 2020-09-24 RX ORDER — CEFAZOLIN SODIUM/WATER 2 G/20 ML
2 SYRINGE (ML) INTRAVENOUS ONCE
Status: COMPLETED | OUTPATIENT
Start: 2020-09-24 | End: 2020-09-24

## 2020-09-24 RX ORDER — BISACODYL 10 MG
10 SUPPOSITORY, RECTAL RECTAL
Status: DISCONTINUED | OUTPATIENT
Start: 2020-09-24 | End: 2020-09-25

## 2020-09-24 RX ORDER — DEXAMETHASONE SODIUM PHOSPHATE 4 MG/ML
VIAL (ML) INJECTION AS NEEDED
Status: DISCONTINUED | OUTPATIENT
Start: 2020-09-24 | End: 2020-09-24 | Stop reason: SURG

## 2020-09-24 RX ORDER — LABETALOL HYDROCHLORIDE 5 MG/ML
5 INJECTION, SOLUTION INTRAVENOUS EVERY 5 MIN PRN
Status: DISCONTINUED | OUTPATIENT
Start: 2020-09-24 | End: 2020-09-24 | Stop reason: HOSPADM

## 2020-09-24 RX ORDER — OMEGA-3-ACID ETHYL ESTERS 1 G/1
1 CAPSULE, LIQUID FILLED ORAL DAILY
Status: DISCONTINUED | OUTPATIENT
Start: 2020-09-24 | End: 2020-09-24

## 2020-09-24 RX ORDER — MORPHINE SULFATE 4 MG/ML
4 INJECTION, SOLUTION INTRAMUSCULAR; INTRAVENOUS EVERY 2 HOUR PRN
Status: DISCONTINUED | OUTPATIENT
Start: 2020-09-24 | End: 2020-09-25

## 2020-09-24 RX ORDER — SODIUM CHLORIDE, SODIUM LACTATE, POTASSIUM CHLORIDE, CALCIUM CHLORIDE 600; 310; 30; 20 MG/100ML; MG/100ML; MG/100ML; MG/100ML
INJECTION, SOLUTION INTRAVENOUS CONTINUOUS
Status: DISCONTINUED | OUTPATIENT
Start: 2020-09-24 | End: 2020-09-25

## 2020-09-24 RX ORDER — DOCUSATE SODIUM 100 MG/1
100 CAPSULE, LIQUID FILLED ORAL 2 TIMES DAILY
Status: DISCONTINUED | OUTPATIENT
Start: 2020-09-24 | End: 2020-09-25

## 2020-09-24 RX ORDER — ACETAMINOPHEN 500 MG
1000 TABLET ORAL ONCE
Status: DISCONTINUED | OUTPATIENT
Start: 2020-09-24 | End: 2020-09-24 | Stop reason: HOSPADM

## 2020-09-24 RX ORDER — MIDAZOLAM HYDROCHLORIDE 1 MG/ML
INJECTION INTRAMUSCULAR; INTRAVENOUS AS NEEDED
Status: DISCONTINUED | OUTPATIENT
Start: 2020-09-24 | End: 2020-09-24 | Stop reason: SURG

## 2020-09-24 RX ORDER — METOCLOPRAMIDE HYDROCHLORIDE 5 MG/ML
10 INJECTION INTRAMUSCULAR; INTRAVENOUS EVERY 6 HOURS PRN
Status: DISCONTINUED | OUTPATIENT
Start: 2020-09-24 | End: 2020-09-25

## 2020-09-24 RX ORDER — SODIUM CHLORIDE, SODIUM LACTATE, POTASSIUM CHLORIDE, CALCIUM CHLORIDE 600; 310; 30; 20 MG/100ML; MG/100ML; MG/100ML; MG/100ML
INJECTION, SOLUTION INTRAVENOUS CONTINUOUS
Status: DISCONTINUED | OUTPATIENT
Start: 2020-09-24 | End: 2020-09-24 | Stop reason: HOSPADM

## 2020-09-24 RX ORDER — EPHEDRINE SULFATE 50 MG/ML
INJECTION, SOLUTION INTRAVENOUS AS NEEDED
Status: DISCONTINUED | OUTPATIENT
Start: 2020-09-24 | End: 2020-09-24 | Stop reason: SURG

## 2020-09-24 RX ORDER — BACITRACIN 50000 [USP'U]/1
INJECTION, POWDER, LYOPHILIZED, FOR SOLUTION INTRAMUSCULAR AS NEEDED
Status: DISCONTINUED | OUTPATIENT
Start: 2020-09-24 | End: 2020-09-24 | Stop reason: HOSPADM

## 2020-09-24 RX ORDER — ONDANSETRON 2 MG/ML
INJECTION INTRAMUSCULAR; INTRAVENOUS AS NEEDED
Status: DISCONTINUED | OUTPATIENT
Start: 2020-09-24 | End: 2020-09-24 | Stop reason: SURG

## 2020-09-24 RX ORDER — UBIDECARENONE 100 MG
CAPSULE ORAL DAILY
Status: DISCONTINUED | OUTPATIENT
Start: 2020-09-24 | End: 2020-09-24 | Stop reason: RX

## 2020-09-24 RX ORDER — MORPHINE SULFATE 2 MG/ML
2 INJECTION, SOLUTION INTRAMUSCULAR; INTRAVENOUS EVERY 2 HOUR PRN
Status: DISCONTINUED | OUTPATIENT
Start: 2020-09-24 | End: 2020-09-25

## 2020-09-24 RX ORDER — ONDANSETRON 2 MG/ML
4 INJECTION INTRAMUSCULAR; INTRAVENOUS EVERY 4 HOURS PRN
Status: ACTIVE | OUTPATIENT
Start: 2020-09-24 | End: 2020-09-25

## 2020-09-24 RX ORDER — ACETAMINOPHEN 160 MG
2000 TABLET,DISINTEGRATING ORAL DAILY
Status: DISCONTINUED | OUTPATIENT
Start: 2020-09-25 | End: 2020-09-25

## 2020-09-24 RX ORDER — DIPHENHYDRAMINE HCL 25 MG
25 CAPSULE ORAL EVERY 4 HOURS PRN
Status: DISCONTINUED | OUTPATIENT
Start: 2020-09-24 | End: 2020-09-25

## 2020-09-24 RX ADMIN — LIDOCAINE HYDROCHLORIDE 100 MG: 40 INJECTION, SOLUTION RETROBULBAR; TOPICAL at 07:37:00

## 2020-09-24 RX ADMIN — EPHEDRINE SULFATE 5 MG: 50 INJECTION, SOLUTION INTRAVENOUS at 08:55:00

## 2020-09-24 RX ADMIN — ROCURONIUM BROMIDE 10 MG: 10 INJECTION, SOLUTION INTRAVENOUS at 07:37:00

## 2020-09-24 RX ADMIN — EPHEDRINE SULFATE 5 MG: 50 INJECTION, SOLUTION INTRAVENOUS at 08:37:00

## 2020-09-24 RX ADMIN — DEXAMETHASONE SODIUM PHOSPHATE 8 MG: 4 MG/ML VIAL (ML) INJECTION at 08:13:00

## 2020-09-24 RX ADMIN — ONDANSETRON 4 MG: 2 INJECTION INTRAMUSCULAR; INTRAVENOUS at 11:46:00

## 2020-09-24 RX ADMIN — MIDAZOLAM HYDROCHLORIDE 2 MG: 1 INJECTION INTRAMUSCULAR; INTRAVENOUS at 07:31:00

## 2020-09-24 RX ADMIN — EPHEDRINE SULFATE 5 MG: 50 INJECTION, SOLUTION INTRAVENOUS at 10:31:00

## 2020-09-24 RX ADMIN — SODIUM CHLORIDE, SODIUM LACTATE, POTASSIUM CHLORIDE, CALCIUM CHLORIDE: 600; 310; 30; 20 INJECTION, SOLUTION INTRAVENOUS at 10:21:00

## 2020-09-24 RX ADMIN — SODIUM CHLORIDE, SODIUM LACTATE, POTASSIUM CHLORIDE, CALCIUM CHLORIDE: 600; 310; 30; 20 INJECTION, SOLUTION INTRAVENOUS at 09:09:00

## 2020-09-24 RX ADMIN — ACETAMINOPHEN 1000 MG: 10 INJECTION, SOLUTION INTRAVENOUS at 11:43:00

## 2020-09-24 RX ADMIN — SODIUM CHLORIDE, SODIUM LACTATE, POTASSIUM CHLORIDE, CALCIUM CHLORIDE: 600; 310; 30; 20 INJECTION, SOLUTION INTRAVENOUS at 10:22:00

## 2020-09-24 RX ADMIN — CEFAZOLIN SODIUM/WATER 2 G: 2 G/20 ML SYRINGE (ML) INTRAVENOUS at 07:53:00

## 2020-09-24 RX ADMIN — MIDAZOLAM HYDROCHLORIDE 2 MG: 1 INJECTION INTRAMUSCULAR; INTRAVENOUS at 09:18:00

## 2020-09-24 NOTE — PLAN OF CARE
NURSING ADMISSION NOTE      Patient arrived to unit from PACU. Pt and spouse oriented to room. Safety precautions initiated. Bed in low position. Call light within reach. Grigsby catheter in place.  Current pain is moderate and tolerable, pt comfortable and v

## 2020-09-24 NOTE — H&P
[facility]  Neurological Surgery History and Physical    Ketty Grover, 67year old male Patient Status:  Outpatient in a Bed    1940 MRN [id number]   Location [address] [place] [address] Attending Siddharth Hill MD   w/ intraoperative cholangiogram                 Family History   Problem Relation Age of Onset   • Other (skin cancer [Other]) Father         Social History    Socioeconomic History      Marital status: Single      Spouse name: Not on file  [employer] canal stenosis. C7-T1:  Mild central disc bulge without spinal canal stenosis.     CRANIOCERVICAL AREA:  Normal foramen magnum with no Chiari malformation.    PARASPINAL AREA:  Normal with no visible mass.          CONCLUSION:       1.  Large posterior dis         5/5  Intrinsics             3/5         3/5                                              EHL                     5/5 4/5                                     Patella                3+/4       3+/4  DF                       2/9 4/5    given the extent of his stenosis and spinal cord involvement, and that he has had symptoms for 2 years, surgery would be to arrest progression of his symptoms but would not guarantee resolution or significant improvement. He understands.  He is a care taker

## 2020-09-24 NOTE — ANESTHESIA PROCEDURE NOTES
Airway  Date/Time: 9/24/2020 7:37 AM  Urgency: elective      General Information and Staff    Patient location during procedure: OR  Anesthesiologist: Bharat Aguirre MD    Indications and Patient Condition  Indications for airway management: anesthesia

## 2020-09-24 NOTE — ANESTHESIA PREPROCEDURE EVALUATION
PRE-OP EVALUATION    Patient Name: Magen Johnson    Pre-op Diagnosis: Cervical spondylosis with myelopathy and radiculopathy [M47.12, M47.22]    Procedure(s):  anterior cervical four- five and cervical five- six discectomy and fusion      Surgeon(s) and (M47.12, M47.22)                                  Past Surgical History:   Procedure Laterality Date   • HERNIA SURGERY      left inguinal hernia   • LAPAROSCOPIC CHOLECYSTECTOMY  2/7/2011    w/ intraoperative cholangiogram     Social History    Tobacco Us post-induction A-line. We discussed analgesic plan and PONV prophylaxis. The patient's questions were answered and consent was attained.

## 2020-09-24 NOTE — ANESTHESIA PROCEDURE NOTES
Arterial Line  Performed by: Paola Arrieta MD  Authorized by: Paola Arrieta MD     General Information and Staff    Procedure Start:  9/24/2020 7:38 AM  Procedure End:  9/24/2020 7:38 AM  Anesthesiologist:  Paola Arrieta MD  Patient Location:

## 2020-09-24 NOTE — ANESTHESIA POSTPROCEDURE EVALUATION
[address] Patient Status:  Outpatient in a Bed   Age/Gender 67year old male MRN [id number]   Location [address] [facility] Attending Caitlyn Israel MD   Hosp Day # 0 PCP Peña Guerrero MD       Anesthesia Post

## 2020-09-24 NOTE — PROGRESS NOTES
Pharmacy Note: Dietary Supplement Discontinuation Per Policy    KQV-86 has been discontinued on Ting Shannon per policy. This supplement may be restarted upon discharge using the medication reconciliation process.     Thank you,   Kenya Monique P

## 2020-09-24 NOTE — OPERATIVE REPORT
[facility]  Operative Note    Elena Books Patient Status:  Outpatient in a Bed    1940 MRN [id number]   [facility] SURGERY Attending Elida García MD   PCP Serge Maria MD Date of Surgery 2020     PREOPERATIVE DIAGNO the skin. Hemostasis was achieved using bipolar electrocautery. We dissected the soft tissue away from the platysma using Metzenbaum scissors. We then opened the platysma along the anterior border of the sternocleidomastoid muscle.  We used sharp dissection area that was controlled with Gelfoam powder soaked in thrombin. I used a rasp to rough up the endplates of C4 and C5. I felt an 7 mm height interbody implant would be the appropriate height.  We filled this with morselized allograft and placed it into the

## 2020-09-24 NOTE — PROGRESS NOTES
Spoke with Dr Ricky Crowder (PCP) on phone, he will be by later this evening. Home meds reconciled. Also spoke with Dr Sara Moss, got order for [place] post-op.

## 2020-09-25 VITALS
DIASTOLIC BLOOD PRESSURE: 84 MMHG | WEIGHT: 189 LBS | HEART RATE: 64 BPM | TEMPERATURE: 98 F | HEIGHT: 72 IN | BODY MASS INDEX: 25.6 KG/M2 | RESPIRATION RATE: 18 BRPM | OXYGEN SATURATION: 96 % | SYSTOLIC BLOOD PRESSURE: 138 MMHG

## 2020-09-25 PROCEDURE — 97161 PT EVAL LOW COMPLEX 20 MIN: CPT

## 2020-09-25 PROCEDURE — 85027 COMPLETE CBC AUTOMATED: CPT | Performed by: NEUROLOGICAL SURGERY

## 2020-09-25 PROCEDURE — 97165 OT EVAL LOW COMPLEX 30 MIN: CPT

## 2020-09-25 PROCEDURE — 97116 GAIT TRAINING THERAPY: CPT

## 2020-09-25 PROCEDURE — 97535 SELF CARE MNGMENT TRAINING: CPT

## 2020-09-25 RX ORDER — DIAZEPAM 5 MG/1
5 TABLET ORAL 3 TIMES DAILY PRN
Qty: 15 TABLET | Refills: 0 | Status: SHIPPED | OUTPATIENT
Start: 2020-09-25 | End: 2020-12-24 | Stop reason: ALTCHOICE

## 2020-09-25 RX ORDER — HYDROCODONE BITARTRATE AND ACETAMINOPHEN 10; 325 MG/1; MG/1
1-2 TABLET ORAL EVERY 4 HOURS PRN
Qty: 20 TABLET | Refills: 0 | Status: SHIPPED | OUTPATIENT
Start: 2020-09-25 | End: 2020-12-24 | Stop reason: ALTCHOICE

## 2020-09-25 NOTE — PLAN OF CARE
Patient ambulated in the halls with standby assist ,tolerated well ,pain is very minimal ,refused for pain meds ,swallowing with no difficulty ,dressing to anterior neck clean and dry ,soft collar on ,vital signs stable ,denies any chest pain or short of b

## 2020-09-25 NOTE — PROGRESS NOTES
[facility]  Neurosurgery Progress Note    Raymond Fisher Patient Status:  Outpatient in a Bed    1940 MRN [id number]   [facility] 3SW-A Attending Dennis Singh MD   Hosp Day # 0 PCP Andria Meek MD     Chief Complaint:  Julienne Wells

## 2020-09-25 NOTE — PLAN OF CARE
Problem: Impaired Activities of Daily Living  Goal: Achieve highest/safest level of independence in self care  Description: Interventions:  - Assess ability and encourage patient to participate in ADLs to maximize function  - Promote sitting position [facility]

## 2020-09-25 NOTE — CM/SW NOTE
09/25/20 1200   CM/SW Screening   Referral Source Social Work (self-referral)   Information Source Chart review;Nursing rounds   Patient's Mental Status Alert;Oriented   Patient's [address]   Number of Levels in Home 2   Patient lives with

## 2020-09-25 NOTE — PLAN OF CARE
A&O x4. VSS. Room air. Pain controlled. Reports increased sensation to 4th and 5th fingers on R and L hand. Up with min assist w/walker, hx of R foot drop, pt states mobility is at baseline. Coverlet dressing on incision site, soft cervical collar.  Bilater

## 2020-09-25 NOTE — PROGRESS NOTES
Wife at bedside. Swallowing precautions reviewed. Reviewed indications, side effects of pain medication/narcotics and constipation prevention.  Stressed importance of increased fluids/roughage in diet, continued use stool softeners along with laxatives and

## 2020-09-25 NOTE — OCCUPATIONAL THERAPY NOTE
OCCUPATIONAL THERAPY QUICK EVALUATION - INPATIENT    Room Number: 353/353-A  Evaluation Date: 9/25/2020     Type of Evaluation: Initial & Quick Eval  Presenting Problem: C4-6 ACDF    Physician Order: IP Consult to Occupational Therapy  Reason for Therapy: for h/o R foot drop. SUBJECTIVE   Pt states \"I can't drive with it on! \" RE AFO.      Patient self-stated goal is to go home    OBJECTIVE  Precautions: Spine(soft collar, R foot droop)  Fall Risk: High fall risk    WEIGHT BEARING RESTRICTION  Weight B height toilet with supervision. Educated on safety with bathing and shower chair recommendation. Patient End of Session: Up in chair;Needs met;Call light within reach;RN aware of session/findings;Bracing education provided; All patient questions an

## 2020-09-25 NOTE — PROGRESS NOTES
Discharge instructions provided to patient and significant other, Herbert Duran. Discharge education video played. IV removed. Pt states understanding of discharge education teaching and has no further questions at this time.  Prescriptions sent to preferred pharmacy

## 2020-09-25 NOTE — H&P
[facility]    PATIENT'S NAME: Zhou Burrell   ATTENDING PHYSICIAN: Patricia Villar MD   PATIENT ACCOUNT#:   [de-identified]    LOCATION:  [address] [place] [address]. [address] [facility] #:   [id number]       YOB: 1940  ADMISSION DATE:       09/24/202 rate and rhythm. ABDOMEN:  Soft, nontender. Good bowel sounds. EXTREMITIES:  Normal.    NEUROLOGIC:  Intact.      LABORATORY DATA:      IMPRESSION:  This is a 52-year-old gentleman who presents with CP who presents with moderate spinal canal stenosis o

## 2020-09-25 NOTE — PHYSICAL THERAPY NOTE
PHYSICAL THERAPY QUICK EVALUATION - INPATIENT    Room Number: 353/353-A  Evaluation Date: 9/25/2020  Presenting Problem: s/p C4-6 ACDF 9/24/20  Physician Order: PT Eval and Treat    Problem List  Active Problems:    * No active hospital problems.  * surgical  Management Techniques: Body mechanics; Activity promotion   RANGE OF MOTION AND STRENGTH ASSESSMENT  Upper extremity ROM and strength are within functional limits     Lower extremity ROM is within functional limits except right ankle    Lower extr ankle pumps for DVT prevention. Pt demonstrating all fxal mobility including bed mobility, transfers, ambulation on levels and on stairs, simulated car transfers with supervision assist, able to adhere to spine precautions with all fxal mobility.  Pt repor this patient is demonstrating a 36% degree of impairment in mobility. Research supports that patients with this level of impairment may benefit from home with supervision.   .  Based on this evaluation, patient's clinical presentation is stable and overall

## 2020-10-07 ENCOUNTER — OFFICE VISIT (OUTPATIENT)
Dept: SURGERY | Facility: CLINIC | Age: 80
End: 2020-10-07
Payer: COMMERCIAL

## 2020-10-07 VITALS — DIASTOLIC BLOOD PRESSURE: 64 MMHG | SYSTOLIC BLOOD PRESSURE: 130 MMHG

## 2020-10-07 DIAGNOSIS — M43.22 FUSION OF SPINE, CERVICAL REGION: ICD-10-CM

## 2020-10-07 DIAGNOSIS — M47.22 CERVICAL SPONDYLOSIS WITH MYELOPATHY AND RADICULOPATHY: Primary | ICD-10-CM

## 2020-10-07 DIAGNOSIS — M47.12 CERVICAL SPONDYLOSIS WITH MYELOPATHY AND RADICULOPATHY: Primary | ICD-10-CM

## 2020-10-07 PROCEDURE — 99024 POSTOP FOLLOW-UP VISIT: CPT | Performed by: NEUROLOGICAL SURGERY

## 2020-10-07 PROCEDURE — 3078F DIAST BP <80 MM HG: CPT | Performed by: NEUROLOGICAL SURGERY

## 2020-10-07 PROCEDURE — 3075F SYST BP GE 130 - 139MM HG: CPT | Performed by: NEUROLOGICAL SURGERY

## 2020-10-07 NOTE — PROGRESS NOTES
[facility]  Neurological Surgery Follow Up Clinic Note    Magen Johnson 67year old, male    1940 MRN [id number]   PCP Ana Luisa Matthews MD Allergies No Known Allergies     SUBJECTIVE:  Ankita Martinez is s/p C4-5 and C5-6 anterior cervi

## 2020-10-07 NOTE — PROGRESS NOTES
Location of Pain:shoulder area more right side    Date Pain Began: after surgery 9/24/2020            Work Related:           Receiving Work Comp/Disability:       Numeric Rating Scale:  Pain at Present:  1/10

## 2020-10-15 ENCOUNTER — OFFICE VISIT (OUTPATIENT)
Dept: PHYSICAL THERAPY | Age: 80
End: 2020-10-15
Attending: NEUROLOGICAL SURGERY
Payer: MEDICARE

## 2020-10-15 ENCOUNTER — TELEPHONE (OUTPATIENT)
Dept: PHYSICAL THERAPY | Age: 80
End: 2020-10-15

## 2020-10-15 DIAGNOSIS — M47.22 CERVICAL SPONDYLOSIS WITH MYELOPATHY AND RADICULOPATHY: ICD-10-CM

## 2020-10-15 DIAGNOSIS — M43.22 FUSION OF SPINE, CERVICAL REGION: ICD-10-CM

## 2020-10-15 DIAGNOSIS — M47.12 CERVICAL SPONDYLOSIS WITH MYELOPATHY AND RADICULOPATHY: ICD-10-CM

## 2020-10-15 PROCEDURE — 97110 THERAPEUTIC EXERCISES: CPT

## 2020-10-15 PROCEDURE — 97162 PT EVAL MOD COMPLEX 30 MIN: CPT

## 2020-10-15 NOTE — PROGRESS NOTES
SPINE EVALUATION:   Referring Physician: Dr. Hansel Gallegos  Diagnosis: S/P cervical spine fusion C4-5, C5-6    Date of Service: 10/15/2020     PATIENT SUMMARY   Anshu Gama is a 67year old male who presents to therapy today 4 weeks s/p cervical spine fusi symptoms are consistent with diagnosis of myelopathy, 4 weeks s/p cervical spine fusion. Pt and PT discussed evaluation findings, pathology, POC and HEP. Pt voiced understanding and performs HEP correctly without reported pain.  Skilled Physical Therapy is corrections and importance of remaining active  Patient was instructed in and issued a HEP for: Seated and supine cervical spine ROM x 5 each direction, supine deep neck flexor activation head nod 10 x 3 sec hold    Charges: PT Eval Moderate Complexity, EX care.    X___________________________________________________ Date____________________    Certification From: 82/40/8461  To:1/13/2021

## 2020-10-19 ENCOUNTER — OFFICE VISIT (OUTPATIENT)
Dept: PHYSICAL THERAPY | Age: 80
End: 2020-10-19
Attending: NEUROLOGICAL SURGERY
Payer: MEDICARE

## 2020-10-19 PROCEDURE — 97110 THERAPEUTIC EXERCISES: CPT

## 2020-10-19 PROCEDURE — 97112 NEUROMUSCULAR REEDUCATION: CPT

## 2020-10-19 NOTE — PROGRESS NOTES
Dx: S/P C4-5, C5-6 fusion         Insurance (Authorized # of Visits):  8           Authorizing Physician: Dr. Megan Ferrera  Next MD visit: November 2020  Fall Risk: standard         Precautions:  Foot drop R, B hand and finger tingling/numbness             Delaney Christie and finger ext x 10, pronation/supination x 10, radial deviation x 10, wrist and finger flexion x 10       Supine deep neck flexor training head nod 10 x 3 sec hold  Supine gentle shoulder depression stretch 3 x 10 sec hold  Supine gentle cervical spine ro

## 2020-10-21 ENCOUNTER — OFFICE VISIT (OUTPATIENT)
Dept: PHYSICAL THERAPY | Age: 80
End: 2020-10-21
Attending: NEUROLOGICAL SURGERY
Payer: MEDICARE

## 2020-10-21 PROCEDURE — 97112 NEUROMUSCULAR REEDUCATION: CPT

## 2020-10-21 PROCEDURE — 97110 THERAPEUTIC EXERCISES: CPT

## 2020-10-21 NOTE — PROGRESS NOTES
Dx: S/P C4-5, C5-6 fusion         Insurance (Authorized # of Visits):  8           Authorizing Physician: Dr. Edenilson Hansen  Next MD visit: November 2020  Fall Risk: standard         Precautions:  Foot drop R, B hand and finger tingling/numbness             Dian Cruz flexion, extension x 5 each      Seated isometric cervical spine flex, ext, side flex, rotation x 10 each  Shoulder shrugs x 10 Seated shoulder shrugs,  scapular squeezes x 10  Isometric cervical spine ext, flex, side flex, rotation x 10 each      Seated w

## 2020-10-26 ENCOUNTER — OFFICE VISIT (OUTPATIENT)
Dept: PHYSICAL THERAPY | Age: 80
End: 2020-10-26
Attending: NEUROLOGICAL SURGERY
Payer: MEDICARE

## 2020-10-26 PROCEDURE — 97110 THERAPEUTIC EXERCISES: CPT

## 2020-10-26 PROCEDURE — 97112 NEUROMUSCULAR REEDUCATION: CPT

## 2020-10-26 NOTE — PROGRESS NOTES
Dx: S/P C4-5, C5-6 fusion         Insurance (Authorized # of Visits):  8           Authorizing Physician: Dr. Alicia Coburn  Next MD visit: November 2020  Fall Risk: standard         Precautions:  Foot drop R, B hand and finger tingling/numbness             Tally Pour rotation, side flexion, extension x 5 each Scifit UE level 2 5 mins  Seated AROM cervical spine flexion, rotation, side flexion, extension x 5 each     Seated isometric cervical spine flex, ext, side flex, rotation x 10 each  Shoulder shrugs x 10 Seated sh extension stretch with hands on table top, wall slide shoulder flexion, alternating shoulder flexion, horizontal shoulder abduction.  All exercises 10 reps increasing to 2 sets as tolerated over the next 2 weeks    Charges: NMRed 1, EX 2      Total Timed Tr

## 2020-10-29 ENCOUNTER — IMMUNIZATION (OUTPATIENT)
Dept: FAMILY MEDICINE CLINIC | Facility: CLINIC | Age: 80
End: 2020-10-29
Payer: COMMERCIAL

## 2020-10-29 DIAGNOSIS — Z23 INFLUENZA VACCINE NEEDED: Primary | ICD-10-CM

## 2020-10-29 PROCEDURE — G0008 ADMIN INFLUENZA VIRUS VAC: HCPCS | Performed by: NURSE PRACTITIONER

## 2020-10-29 PROCEDURE — 90662 IIV NO PRSV INCREASED AG IM: CPT | Performed by: NURSE PRACTITIONER

## 2020-10-30 ENCOUNTER — OFFICE VISIT (OUTPATIENT)
Dept: PHYSICAL THERAPY | Age: 80
End: 2020-10-30
Attending: NEUROLOGICAL SURGERY
Payer: MEDICARE

## 2020-10-30 PROCEDURE — 97110 THERAPEUTIC EXERCISES: CPT

## 2020-10-30 PROCEDURE — 97112 NEUROMUSCULAR REEDUCATION: CPT

## 2020-10-30 NOTE — PROGRESS NOTES
Dx: S/P C4-5, C5-6 fusion         Insurance (Authorized # of Visits):  8           Authorizing Physician: Dr. Ambriz Necessary  Next MD visit: November 2020  Fall Risk: standard         Precautions:  Foot drop R, B hand and finger tingling/numbness             Sammy Goodrich TX#: 4/8 Date: 10/30/2020              TX#: 5/8 Date:    Tx#: 6/   Seated AROM cervical spine rotation, flexion, side flexion x 5 each Seated posture correction  AROM cervical spine flexion, rotation, side flexion, extension x 5 each Scifit UE lev Scifit UE level 1 4 mins  Standing green t band rows x 10 Standing green t band rows x 20  Standing with back against wall alternating shoulder flexion R/L x 10 X 20  Standing wall slides flexion x 10  Back against wall alternating sh flexion R/L x 10

## 2020-11-02 ENCOUNTER — OFFICE VISIT (OUTPATIENT)
Dept: PHYSICAL THERAPY | Age: 80
End: 2020-11-02
Attending: NEUROLOGICAL SURGERY
Payer: MEDICARE

## 2020-11-02 PROCEDURE — 97112 NEUROMUSCULAR REEDUCATION: CPT

## 2020-11-02 PROCEDURE — 97110 THERAPEUTIC EXERCISES: CPT

## 2020-11-02 NOTE — PROGRESS NOTES
Dx: S/P C4-5, C5-6 fusion         Insurance (Authorized # of Visits):  8           Authorizing Physician: Dr. Norma Duke  Next MD visit: November 2020  Fall Risk: standard         Precautions:  Foot drop R, B hand and finger tingling/numbness             Sarah Bart 2 5 mins  Seated AROM cervical spine flexion, rotation, side flexion, extension x 5 each Scifit UE level 2 5 mins  Seated cervical spine ROM x 10 each flex, ext, side flex, rotation  Sh shrugs x 10   Scifit UE level 2 5 mins  Seated AROM cervical spine fle elbow flex, ext, forearm pronation, supination x 10  Supine triceps ext in flexion with 2# wt R/L x 10 Seated assisted wrist ext R x 10   AROM elbow flex, ext forearm pronation, supination x 10   Supine triceps ext in flexion with 3 lbs wt R/L x 10     Sci

## 2020-11-04 ENCOUNTER — OFFICE VISIT (OUTPATIENT)
Dept: PHYSICAL THERAPY | Age: 80
End: 2020-11-04
Attending: NEUROLOGICAL SURGERY
Payer: MEDICARE

## 2020-11-04 PROCEDURE — 97112 NEUROMUSCULAR REEDUCATION: CPT

## 2020-11-04 PROCEDURE — 97110 THERAPEUTIC EXERCISES: CPT

## 2020-11-04 NOTE — PROGRESS NOTES
Dx: S/P C4-5, C5-6 fusion         Insurance (Authorized # of Visits):  8           Authorizing Physician: Dr. John Messina  Next MD visit: November 2020  Fall Risk: standard         Precautions:  Foot drop R, B hand and finger tingling/numbness             Shayy Finney 3/8 Date: 10/26/2020              TX#: 4/8 Date: 10/30/2020              TX#: 5/8 Date: 11/2/2020  Tx#: 6/8 Date: 11/4/2020  Tx#: 7/8   Seated AROM cervical spine rotation, flexion, side flexion x 5 each Seated posture correction  AROM cervical spine flexi R/L x 10 each Seated isometric strengthening cervical spine flex, ext, side flex, rotation x 10 each Supine cervical spine ROM flex, side flex, rotation x 10 each   Seated isometric strengthening cervical spine flex, ext, side flex, rotation x 10 Supine ge cervical spine rotation, head nod flexion, sh shrugs and scap squeezes, closed chain wrist and finger extension stretch with hands on table top, wall slide shoulder flexion, alternating shoulder flexion, horizontal shoulder abduction.  All exercises 10 reps

## 2020-11-09 ENCOUNTER — OFFICE VISIT (OUTPATIENT)
Dept: PHYSICAL THERAPY | Age: 80
End: 2020-11-09
Attending: NEUROLOGICAL SURGERY
Payer: MEDICARE

## 2020-11-09 PROCEDURE — 97110 THERAPEUTIC EXERCISES: CPT

## 2020-11-09 NOTE — PROGRESS NOTES
Dx: S/P C4-5, C5-6 fusion         Insurance (Authorized # of Visits):  8           Authorizing Physician: Dr. Hansel Gallegos  Next MD visit: November 2020  Fall Risk: standard         Precautions:  Foot drop R, B hand and finger tingling/numbness           Dischar cervical spine flexion, rotation, side flexion, extension x 5 each Scifit UE level 2 5 mins  Seated AROM cervical spine flexion, rotation, side flexion, extension x 5 each Scifit UE level 2 5 mins  Seated cervical spine ROM x 10 each flex, ext, side flex, stretch 3 x 10 sec hold Seated isometric cervical spine flex, ext, side flex, rot R/L x 10 each Seated isometric strengthening cervical spine flex, ext, side flex, rotation x 10 each Supine cervical spine ROM flex, side flex, rotation x 10 each   Seated is wrist flexion x 10         Posture education and HEP instruction 5 mins         HEP: Seated and supine cervical spine rotation, head nod flexion, sh shrugs and scap squeezes, closed chain wrist and finger extension stretch with hands on table top, wall sli

## 2020-11-10 ENCOUNTER — TELEPHONE (OUTPATIENT)
Dept: PHYSICAL THERAPY | Age: 80
End: 2020-11-10

## 2020-11-11 ENCOUNTER — APPOINTMENT (OUTPATIENT)
Dept: PHYSICAL THERAPY | Age: 80
End: 2020-11-11
Attending: NEUROLOGICAL SURGERY
Payer: MEDICARE

## 2020-11-12 ENCOUNTER — LAB ENCOUNTER (OUTPATIENT)
Dept: LAB | Age: 80
End: 2020-11-12
Attending: INTERNAL MEDICINE
Payer: MEDICARE

## 2020-11-12 DIAGNOSIS — Z20.822 EXPOSURE TO COVID-19 VIRUS: Primary | ICD-10-CM

## 2020-12-02 ENCOUNTER — HOSPITAL ENCOUNTER (OUTPATIENT)
Age: 80
Discharge: ANOTHER HOSPITAL | End: 2020-12-02
Payer: MEDICARE

## 2020-12-02 ENCOUNTER — HOSPITAL ENCOUNTER (EMERGENCY)
Facility: HOSPITAL | Age: 80
Discharge: HOME | End: 2020-12-02
Attending: EMERGENCY MEDICINE
Payer: MEDICARE

## 2020-12-02 ENCOUNTER — APPOINTMENT (OUTPATIENT)
Dept: PHYSICAL THERAPY | Age: 80
End: 2020-12-02
Attending: NEUROLOGICAL SURGERY
Payer: MEDICARE

## 2020-12-02 VITALS
TEMPERATURE: 98 F | DIASTOLIC BLOOD PRESSURE: 95 MMHG | HEART RATE: 92 BPM | SYSTOLIC BLOOD PRESSURE: 187 MMHG | RESPIRATION RATE: 18 BRPM | OXYGEN SATURATION: 97 %

## 2020-12-02 VITALS
HEART RATE: 68 BPM | SYSTOLIC BLOOD PRESSURE: 142 MMHG | TEMPERATURE: 98 F | OXYGEN SATURATION: 95 % | RESPIRATION RATE: 16 BRPM | DIASTOLIC BLOOD PRESSURE: 80 MMHG

## 2020-12-02 DIAGNOSIS — R33.9 URINARY RETENTION: Primary | ICD-10-CM

## 2020-12-02 PROCEDURE — 51702 INSERT TEMP BLADDER CATH: CPT

## 2020-12-02 PROCEDURE — 99205 OFFICE O/P NEW HI 60 MIN: CPT | Performed by: PHYSICIAN ASSISTANT

## 2020-12-02 PROCEDURE — 99283 EMERGENCY DEPT VISIT LOW MDM: CPT

## 2020-12-02 PROCEDURE — 81001 URINALYSIS AUTO W/SCOPE: CPT | Performed by: EMERGENCY MEDICINE

## 2020-12-03 NOTE — ED PROVIDER NOTES
Patient Seen in: [facility] Emergency Department      History   Patient presents with:  Urinary Symptoms    Stated Complaint: NNIC - Urinary retention    HPI    Patient is a 79-year-old male comes emergency room for urinary retention.   Patient has b status: Former Smoker        Packs/day: 1.00        Years: 20.00        Pack years: 20      Smokeless tobacco: Never Used      Tobacco comment: quit in 1978    Alcohol use:  Yes      Alcohol/week: 4.0 standard drinks      Types: 4 Glasses of wine per week components:       Result Value    Blood Urine Large (*)     RBC URINE >10 (*)     Mucous Urine 1+ (*)     All other components within normal limits          ER course:  Grigsby catheter inserted with return of 1 L of urine          MDM      Patient is a 80-ye

## 2020-12-03 NOTE — ED NOTES
Per Shivam Marti PA-C, attempted to place a 14 Citizen of [place] indwelling catheter for the patient for urinary retention but was unsuccessful. Dorita Person also present at the bedside. I was unable to advance the catheter beyond the prostate.  Attempted to have the pa

## 2020-12-03 NOTE — ED PROVIDER NOTES
Patient Seen in: [address]      History   Patient presents with:  Urinary Retention    Stated Complaint: trouble urinating    HPI    CHIEF COMPLAINT: Urinary retention    HISTORY OF PRESENT ILLNESS: Patient is a pleasant 67-year-old problem. No pertinent social history. Review of Systems    Positive for stated complaint: trouble urinating  Other systems are as noted in HPI. Constitutional and vital signs reviewed.       All other systems reviewed and negative except as n discomfort and we believe that the Grigsby catheter was hitting the prostate and bending forward and unable to advance past the prostate.   We did not have a coudé catheter here at the immediate care we will send the patient over to the emergency department f

## 2020-12-05 ENCOUNTER — HOSPITAL ENCOUNTER (EMERGENCY)
Facility: HOSPITAL | Age: 80
Discharge: HOME | End: 2020-12-05
Attending: EMERGENCY MEDICINE
Payer: MEDICARE

## 2020-12-05 ENCOUNTER — TELEPHONE (OUTPATIENT)
Dept: SURGERY | Facility: HOSPITAL | Age: 80
End: 2020-12-05

## 2020-12-05 VITALS — OXYGEN SATURATION: 95 % | HEART RATE: 66 BPM | SYSTOLIC BLOOD PRESSURE: 133 MMHG | DIASTOLIC BLOOD PRESSURE: 73 MMHG

## 2020-12-05 DIAGNOSIS — R33.9 URINARY RETENTION: Primary | ICD-10-CM

## 2020-12-05 PROCEDURE — 51702 INSERT TEMP BLADDER CATH: CPT

## 2020-12-05 PROCEDURE — 99283 EMERGENCY DEPT VISIT LOW MDM: CPT

## 2020-12-05 PROCEDURE — 81001 URINALYSIS AUTO W/SCOPE: CPT | Performed by: EMERGENCY MEDICINE

## 2020-12-05 PROCEDURE — 87086 URINE CULTURE/COLONY COUNT: CPT | Performed by: EMERGENCY MEDICINE

## 2020-12-05 NOTE — ED PROVIDER NOTES
Patient Seen in: [facility] Emergency Department      History   Patient presents with:  Urinary Symptoms    Stated Complaint: UNABLE TO URINATE    HPI    71-year-old male presents to the emergency department being unable to urinate other than a few Packs/day: 1.00        Years: 20.00        Pack years: 20      Smokeless tobacco: Never Used      Tobacco comment: quit in 1978    Alcohol use:  Yes      Alcohol/week: 4.0 standard drinks      Types: 4 Glasses of wine per week      Frequency: Monthly or les had approximately 8 to 900 cc of clear urine. It was not felt that there was need to irrigate the bladder. I did update urology and they are in agreement to leave the catheter in and patient will follow up next week. I updated the patient with the plan.

## 2020-12-05 NOTE — ED INITIAL ASSESSMENT (HPI)
Pt here due to not being able to pee. Pt was here a few days ago for the same thing. The pt seen the urologist yesterday and they took the catheriter out and was started on Flomax. And today is only able to do drops.

## 2020-12-05 NOTE — TELEPHONE ENCOUNTER
Went to ER with retention today, after void trial yesterday and started silodosin yesterday. Please call him and instruct him to remove his Grigsby catheter at 6 AM the day before his next visit.   Please change his visit so that it is for a cystoscopy and

## 2020-12-07 NOTE — TELEPHONE ENCOUNTER
Pt advised of MD plan. No procedure slots available with MD this week, scheduled for early next week. Offered pt to be seen at [place], pt declined. Offered for pt to keep appt for cath removal this week, pt declined.  Pt would like VT/cysto same day nex

## 2020-12-08 ENCOUNTER — HOSPITAL ENCOUNTER (OUTPATIENT)
Dept: GENERAL RADIOLOGY | Age: 80
Discharge: HOME | End: 2020-12-08
Attending: NEUROLOGICAL SURGERY
Payer: MEDICARE

## 2020-12-08 DIAGNOSIS — M47.22 CERVICAL SPONDYLOSIS WITH MYELOPATHY AND RADICULOPATHY: ICD-10-CM

## 2020-12-08 DIAGNOSIS — M47.12 CERVICAL SPONDYLOSIS WITH MYELOPATHY AND RADICULOPATHY: ICD-10-CM

## 2020-12-08 DIAGNOSIS — M43.22 FUSION OF SPINE, CERVICAL REGION: ICD-10-CM

## 2020-12-16 ENCOUNTER — HOSPITAL ENCOUNTER (EMERGENCY)
Facility: HOSPITAL | Age: 80
Discharge: HOME | End: 2020-12-16
Attending: EMERGENCY MEDICINE
Payer: MEDICARE

## 2020-12-16 VITALS
TEMPERATURE: 97 F | BODY MASS INDEX: 25.73 KG/M2 | OXYGEN SATURATION: 98 % | RESPIRATION RATE: 16 BRPM | WEIGHT: 190 LBS | HEIGHT: 72 IN | HEART RATE: 87 BPM | SYSTOLIC BLOOD PRESSURE: 148 MMHG | DIASTOLIC BLOOD PRESSURE: 92 MMHG

## 2020-12-16 DIAGNOSIS — N40.0 BENIGN PROSTATIC HYPERPLASIA WITHOUT LOWER URINARY TRACT SYMPTOMS: Primary | ICD-10-CM

## 2020-12-16 PROCEDURE — 99283 EMERGENCY DEPT VISIT LOW MDM: CPT

## 2020-12-16 NOTE — ED INITIAL ASSESSMENT (HPI)
Pt states had a reynoso placed yesterday, states urine not draining into bag, noted bleeding and discomfort.

## 2020-12-16 NOTE — ED PROVIDER NOTES
Patient Seen in: [facility] Emergency Department      History   Patient presents with:  Cath Tube Problem  Urinary Symptoms    Stated Complaint: hematuria in reynoso - unable to drain reynoso bag    HPI    Patient is a 71-year-old male comes to emergenc 12/15/2020    Cysto TRUS Dr. Yann Morales History    Tobacco Use      Smoking status: Former Smoker        Packs/day: 1.00        Years: 20.00        Pack years: 20      Smokeless tobacco: Never Used      Tobacco comment: quit in 1978 discharge, that an emergency medical condition was not or was no longer present. There was no indication for further evaluation, treatment or admission on an emergency basis.   Comprehensive verbal and written discharge and follow-up instructions were prov

## 2021-01-04 ENCOUNTER — HOSPITAL ENCOUNTER (OUTPATIENT)
Dept: GENERAL RADIOLOGY | Age: 81
Discharge: HOME | End: 2021-01-04
Attending: NEUROLOGICAL SURGERY
Payer: MEDICARE

## 2021-01-04 ENCOUNTER — TELEPHONE (OUTPATIENT)
Dept: SURGERY | Facility: CLINIC | Age: 81
End: 2021-01-04

## 2021-01-04 DIAGNOSIS — M43.22 FUSION OF SPINE, CERVICAL REGION: ICD-10-CM

## 2021-01-04 DIAGNOSIS — M47.12 CERVICAL SPONDYLOSIS WITH MYELOPATHY AND RADICULOPATHY: ICD-10-CM

## 2021-01-04 DIAGNOSIS — M47.22 CERVICAL SPONDYLOSIS WITH MYELOPATHY AND RADICULOPATHY: ICD-10-CM

## 2021-01-04 PROCEDURE — 72050 X-RAY EXAM NECK SPINE 4/5VWS: CPT | Performed by: NEUROLOGICAL SURGERY

## 2021-01-04 NOTE — TELEPHONE ENCOUNTER
This is a Dr. Woody Hicks patient to be scheduled with Dr. Woody Hicks for follow up. Per LOV with Dr. Woody Hicks   \"Patient will follow with me in 3 months time. At that time we will obtain x-rays of the cervical spine to monitor the fusion process. \"

## 2021-01-06 ENCOUNTER — OFFICE VISIT (OUTPATIENT)
Dept: SURGERY | Facility: CLINIC | Age: 81
End: 2021-01-06
Payer: COMMERCIAL

## 2021-01-06 VITALS
HEIGHT: 72 IN | SYSTOLIC BLOOD PRESSURE: 118 MMHG | DIASTOLIC BLOOD PRESSURE: 70 MMHG | HEART RATE: 64 BPM | BODY MASS INDEX: 25.73 KG/M2 | WEIGHT: 190 LBS

## 2021-01-06 DIAGNOSIS — Z98.1 S/P CERVICAL SPINAL FUSION: ICD-10-CM

## 2021-01-06 DIAGNOSIS — M47.22 CERVICAL SPONDYLOSIS WITH MYELOPATHY AND RADICULOPATHY: Primary | ICD-10-CM

## 2021-01-06 DIAGNOSIS — M47.12 CERVICAL SPONDYLOSIS WITH MYELOPATHY AND RADICULOPATHY: Primary | ICD-10-CM

## 2021-01-06 PROCEDURE — 99213 OFFICE O/P EST LOW 20 MIN: CPT | Performed by: NEUROLOGICAL SURGERY

## 2021-01-06 PROCEDURE — 3078F DIAST BP <80 MM HG: CPT | Performed by: NEUROLOGICAL SURGERY

## 2021-01-06 PROCEDURE — 3008F BODY MASS INDEX DOCD: CPT | Performed by: NEUROLOGICAL SURGERY

## 2021-01-06 PROCEDURE — 3074F SYST BP LT 130 MM HG: CPT | Performed by: NEUROLOGICAL SURGERY

## 2021-01-06 NOTE — PROGRESS NOTES
[facility] [address]  Neurological Surgery Follow Up Clinic Note    Jl Long 79year old, male    1940 MRN [id number]   PCP Trista Alanis MD Allergies No Known Allergies     SUBJECTIVE:  Андрей Jimenez is s/p C4-5 and C5-6 anterior cervi (4VIEWS) (CPT=72050); Future    2. S/P cervical spinal fusion  - XR CERVICAL SPINE (4VIEWS) (CPT=72050); Future     PLAN:  1. I have placed a new order for cervical spine x-ray to be performed in September 2021.   Patient will follow up with me upon its com

## 2021-01-06 NOTE — PROGRESS NOTES
Patient here for 3-month follow-up after PT. Reports improvement after PT. Notes a feeling of esophagus being higher or lower than normal area. Not painful, causes no problems, just different sensation.

## 2021-01-11 ENCOUNTER — LAB ENCOUNTER (OUTPATIENT)
Dept: LAB | Age: 81
DRG: 713 | End: 2021-01-11
Attending: UROLOGY
Payer: MEDICARE

## 2021-01-11 DIAGNOSIS — N40.0 BENIGN PROSTATIC HYPERPLASIA, UNSPECIFIED WHETHER LOWER URINARY TRACT SYMPTOMS PRESENT: ICD-10-CM

## 2021-01-12 LAB — SARS-COV-2 RNA RESP QL NAA+PROBE: NOT DETECTED

## 2021-01-12 NOTE — PROGRESS NOTES
Noted.  Pre-op testing. [facility] sent.     Future Appointments  1/13/2021  2:30 PM    Cresencio Mackenzie MD           [facility]          [facility] GE  1/15/2021  10:00 AM   [facility] UROLOGY NURSE      [facility] URO    [facility]  9/8/2021   1:30 PM    Meño Sanchez MD

## 2021-01-13 ENCOUNTER — HOSPITAL ENCOUNTER (INPATIENT)
Facility: HOSPITAL | Age: 81
LOS: 1 days | Discharge: HOME | DRG: 713 | End: 2021-01-15
Attending: UROLOGY | Admitting: UROLOGY
Payer: MEDICARE

## 2021-01-13 ENCOUNTER — ANESTHESIA EVENT (OUTPATIENT)
Dept: SURGERY | Facility: HOSPITAL | Age: 81
DRG: 713 | End: 2021-01-13
Payer: MEDICARE

## 2021-01-13 ENCOUNTER — ANESTHESIA (OUTPATIENT)
Dept: SURGERY | Facility: HOSPITAL | Age: 81
DRG: 713 | End: 2021-01-13
Payer: MEDICARE

## 2021-01-13 DIAGNOSIS — N40.0 BENIGN PROSTATIC HYPERPLASIA, UNSPECIFIED WHETHER LOWER URINARY TRACT SYMPTOMS PRESENT: Primary | ICD-10-CM

## 2021-01-13 LAB — GLUCOSE BLD-MCNC: 132 MG/DL (ref 70–99)

## 2021-01-13 PROCEDURE — 0VB08ZZ EXCISION OF PROSTATE, VIA NATURAL OR ARTIFICIAL OPENING ENDOSCOPIC: ICD-10-PCS | Performed by: UROLOGY

## 2021-01-13 RX ORDER — OXYBUTYNIN CHLORIDE 5 MG/1
5 TABLET ORAL EVERY 6 HOURS PRN
Status: DISCONTINUED | OUTPATIENT
Start: 2021-01-13 | End: 2021-01-15

## 2021-01-13 RX ORDER — ATORVASTATIN CALCIUM 10 MG/1
10 TABLET, FILM COATED ORAL DAILY
Status: DISCONTINUED | OUTPATIENT
Start: 2021-01-14 | End: 2021-01-15

## 2021-01-13 RX ORDER — HYDRALAZINE HYDROCHLORIDE 25 MG/1
25 TABLET, FILM COATED ORAL EVERY 8 HOURS PRN
Status: DISCONTINUED | OUTPATIENT
Start: 2021-01-13 | End: 2021-01-15

## 2021-01-13 RX ORDER — BISACODYL 10 MG
10 SUPPOSITORY, RECTAL RECTAL
Status: DISCONTINUED | OUTPATIENT
Start: 2021-01-13 | End: 2021-01-15

## 2021-01-13 RX ORDER — HYDROMORPHONE HYDROCHLORIDE 1 MG/ML
0.4 INJECTION, SOLUTION INTRAMUSCULAR; INTRAVENOUS; SUBCUTANEOUS EVERY 5 MIN PRN
Status: DISCONTINUED | OUTPATIENT
Start: 2021-01-13 | End: 2021-01-13 | Stop reason: HOSPADM

## 2021-01-13 RX ORDER — HYDROCODONE BITARTRATE AND ACETAMINOPHEN 5; 325 MG/1; MG/1
2 TABLET ORAL EVERY 4 HOURS PRN
Status: DISCONTINUED | OUTPATIENT
Start: 2021-01-13 | End: 2021-01-15

## 2021-01-13 RX ORDER — LIDOCAINE HYDROCHLORIDE 10 MG/ML
INJECTION, SOLUTION EPIDURAL; INFILTRATION; INTRACAUDAL; PERINEURAL AS NEEDED
Status: DISCONTINUED | OUTPATIENT
Start: 2021-01-13 | End: 2021-01-13 | Stop reason: SURG

## 2021-01-13 RX ORDER — MORPHINE SULFATE 4 MG/ML
4 INJECTION, SOLUTION INTRAMUSCULAR; INTRAVENOUS EVERY 2 HOUR PRN
Status: DISCONTINUED | OUTPATIENT
Start: 2021-01-13 | End: 2021-01-15

## 2021-01-13 RX ORDER — CEFAZOLIN SODIUM/WATER 2 G/20 ML
2 SYRINGE (ML) INTRAVENOUS ONCE
Status: COMPLETED | OUTPATIENT
Start: 2021-01-13 | End: 2021-01-13

## 2021-01-13 RX ORDER — ACETAMINOPHEN 500 MG
1000 TABLET ORAL ONCE
Status: DISCONTINUED | OUTPATIENT
Start: 2021-01-13 | End: 2021-01-13 | Stop reason: HOSPADM

## 2021-01-13 RX ORDER — DIPHENHYDRAMINE HCL 25 MG
25 CAPSULE ORAL NIGHTLY PRN
Status: DISCONTINUED | OUTPATIENT
Start: 2021-01-13 | End: 2021-01-15

## 2021-01-13 RX ORDER — ACETAMINOPHEN 500 MG
1000 TABLET ORAL ONCE
COMMUNITY
End: 2021-04-26

## 2021-01-13 RX ORDER — SODIUM CHLORIDE, SODIUM LACTATE, POTASSIUM CHLORIDE, CALCIUM CHLORIDE 600; 310; 30; 20 MG/100ML; MG/100ML; MG/100ML; MG/100ML
INJECTION, SOLUTION INTRAVENOUS CONTINUOUS
Status: DISCONTINUED | OUTPATIENT
Start: 2021-01-13 | End: 2021-01-14

## 2021-01-13 RX ORDER — DIPHENHYDRAMINE HYDROCHLORIDE 50 MG/ML
12.5 INJECTION INTRAMUSCULAR; INTRAVENOUS NIGHTLY PRN
Status: DISCONTINUED | OUTPATIENT
Start: 2021-01-13 | End: 2021-01-15

## 2021-01-13 RX ORDER — MORPHINE SULFATE 2 MG/ML
2 INJECTION, SOLUTION INTRAMUSCULAR; INTRAVENOUS EVERY 2 HOUR PRN
Status: DISCONTINUED | OUTPATIENT
Start: 2021-01-13 | End: 2021-01-15

## 2021-01-13 RX ORDER — ONDANSETRON 4 MG/1
4 TABLET, ORALLY DISINTEGRATING ORAL EVERY 6 HOURS PRN
Status: DISCONTINUED | OUTPATIENT
Start: 2021-01-13 | End: 2021-01-15

## 2021-01-13 RX ORDER — ONDANSETRON 2 MG/ML
4 INJECTION INTRAMUSCULAR; INTRAVENOUS EVERY 6 HOURS PRN
Status: DISCONTINUED | OUTPATIENT
Start: 2021-01-13 | End: 2021-01-15

## 2021-01-13 RX ORDER — ACETAMINOPHEN 500 MG
1000 TABLET ORAL ONCE AS NEEDED
Status: DISCONTINUED | OUTPATIENT
Start: 2021-01-13 | End: 2021-01-13 | Stop reason: HOSPADM

## 2021-01-13 RX ORDER — DOCUSATE SODIUM 100 MG/1
100 CAPSULE, LIQUID FILLED ORAL 2 TIMES DAILY
Status: DISCONTINUED | OUTPATIENT
Start: 2021-01-13 | End: 2021-01-15

## 2021-01-13 RX ORDER — SODIUM CHLORIDE 0.9 % (FLUSH) 0.9 %
10 SYRINGE (ML) INJECTION AS NEEDED
Status: DISCONTINUED | OUTPATIENT
Start: 2021-01-13 | End: 2021-01-15

## 2021-01-13 RX ORDER — FAMOTIDINE 20 MG/1
20 TABLET ORAL 2 TIMES DAILY
Status: DISCONTINUED | OUTPATIENT
Start: 2021-01-13 | End: 2021-01-15

## 2021-01-13 RX ORDER — MAGNESIUM HYDROXIDE 1200 MG/15ML
100 LIQUID ORAL AS NEEDED
Status: DISCONTINUED | OUTPATIENT
Start: 2021-01-13 | End: 2021-01-15

## 2021-01-13 RX ORDER — SODIUM CHLORIDE, SODIUM LACTATE, POTASSIUM CHLORIDE, CALCIUM CHLORIDE 600; 310; 30; 20 MG/100ML; MG/100ML; MG/100ML; MG/100ML
INJECTION, SOLUTION INTRAVENOUS CONTINUOUS
Status: DISCONTINUED | OUTPATIENT
Start: 2021-01-13 | End: 2021-01-13 | Stop reason: HOSPADM

## 2021-01-13 RX ORDER — HYDROCODONE BITARTRATE AND ACETAMINOPHEN 5; 325 MG/1; MG/1
2 TABLET ORAL AS NEEDED
Status: DISCONTINUED | OUTPATIENT
Start: 2021-01-13 | End: 2021-01-13 | Stop reason: HOSPADM

## 2021-01-13 RX ORDER — HYDROCODONE BITARTRATE AND ACETAMINOPHEN 5; 325 MG/1; MG/1
1 TABLET ORAL EVERY 4 HOURS PRN
Status: DISCONTINUED | OUTPATIENT
Start: 2021-01-13 | End: 2021-01-15

## 2021-01-13 RX ORDER — DEXAMETHASONE SODIUM PHOSPHATE 4 MG/ML
VIAL (ML) INJECTION AS NEEDED
Status: DISCONTINUED | OUTPATIENT
Start: 2021-01-13 | End: 2021-01-13 | Stop reason: SURG

## 2021-01-13 RX ORDER — ACETAMINOPHEN 325 MG/1
650 TABLET ORAL EVERY 4 HOURS PRN
Status: DISCONTINUED | OUTPATIENT
Start: 2021-01-13 | End: 2021-01-15

## 2021-01-13 RX ORDER — PHENYLEPHRINE HCL 10 MG/ML
VIAL (ML) INJECTION AS NEEDED
Status: DISCONTINUED | OUTPATIENT
Start: 2021-01-13 | End: 2021-01-13 | Stop reason: SURG

## 2021-01-13 RX ORDER — MEPERIDINE HYDROCHLORIDE 25 MG/ML
12.5 INJECTION INTRAMUSCULAR; INTRAVENOUS; SUBCUTANEOUS AS NEEDED
Status: DISCONTINUED | OUTPATIENT
Start: 2021-01-13 | End: 2021-01-13 | Stop reason: HOSPADM

## 2021-01-13 RX ORDER — ATROPA BELLADONNA AND OPIUM 16.2; 6 MG/1; MG/1
1 SUPPOSITORY RECTAL EVERY 6 HOURS PRN
Status: DISCONTINUED | OUTPATIENT
Start: 2021-01-13 | End: 2021-01-15

## 2021-01-13 RX ORDER — SODIUM PHOSPHATE, DIBASIC AND SODIUM PHOSPHATE, MONOBASIC 7; 19 G/133ML; G/133ML
1 ENEMA RECTAL ONCE AS NEEDED
Status: DISCONTINUED | OUTPATIENT
Start: 2021-01-13 | End: 2021-01-15

## 2021-01-13 RX ORDER — ONDANSETRON 2 MG/ML
4 INJECTION INTRAMUSCULAR; INTRAVENOUS AS NEEDED
Status: DISCONTINUED | OUTPATIENT
Start: 2021-01-13 | End: 2021-01-13 | Stop reason: HOSPADM

## 2021-01-13 RX ORDER — NALOXONE HYDROCHLORIDE 0.4 MG/ML
80 INJECTION, SOLUTION INTRAMUSCULAR; INTRAVENOUS; SUBCUTANEOUS AS NEEDED
Status: DISCONTINUED | OUTPATIENT
Start: 2021-01-13 | End: 2021-01-13 | Stop reason: HOSPADM

## 2021-01-13 RX ORDER — CEFAZOLIN SODIUM/WATER 2 G/20 ML
2 SYRINGE (ML) INTRAVENOUS EVERY 8 HOURS
Status: COMPLETED | OUTPATIENT
Start: 2021-01-14 | End: 2021-01-14

## 2021-01-13 RX ORDER — LABETALOL HYDROCHLORIDE 5 MG/ML
10 INJECTION, SOLUTION INTRAVENOUS EVERY 6 HOURS PRN
Status: DISCONTINUED | OUTPATIENT
Start: 2021-01-13 | End: 2021-01-15

## 2021-01-13 RX ORDER — MORPHINE SULFATE 2 MG/ML
1 INJECTION, SOLUTION INTRAMUSCULAR; INTRAVENOUS EVERY 2 HOUR PRN
Status: DISCONTINUED | OUTPATIENT
Start: 2021-01-13 | End: 2021-01-15

## 2021-01-13 RX ORDER — POLYETHYLENE GLYCOL 3350 17 G/17G
17 POWDER, FOR SOLUTION ORAL DAILY PRN
Status: DISCONTINUED | OUTPATIENT
Start: 2021-01-13 | End: 2021-01-15

## 2021-01-13 RX ORDER — HYDROCODONE BITARTRATE AND ACETAMINOPHEN 5; 325 MG/1; MG/1
1 TABLET ORAL AS NEEDED
Status: DISCONTINUED | OUTPATIENT
Start: 2021-01-13 | End: 2021-01-13 | Stop reason: HOSPADM

## 2021-01-13 RX ADMIN — LIDOCAINE HYDROCHLORIDE 100 MG: 10 INJECTION, SOLUTION EPIDURAL; INFILTRATION; INTRACAUDAL; PERINEURAL at 16:08:00

## 2021-01-13 RX ADMIN — PHENYLEPHRINE HCL 100 MCG: 10 MG/ML VIAL (ML) INJECTION at 16:09:00

## 2021-01-13 RX ADMIN — CEFAZOLIN SODIUM/WATER 2 G: 2 G/20 ML SYRINGE (ML) INTRAVENOUS at 16:12:00

## 2021-01-13 RX ADMIN — SODIUM CHLORIDE, SODIUM LACTATE, POTASSIUM CHLORIDE, CALCIUM CHLORIDE: 600; 310; 30; 20 INJECTION, SOLUTION INTRAVENOUS at 18:16:00

## 2021-01-13 RX ADMIN — DEXAMETHASONE SODIUM PHOSPHATE 4 MG: 4 MG/ML VIAL (ML) INJECTION at 16:12:00

## 2021-01-13 RX ADMIN — PHENYLEPHRINE HCL 100 MCG: 10 MG/ML VIAL (ML) INJECTION at 16:25:00

## 2021-01-13 NOTE — CONSULTS
H&P    Sudden onset inability to void. Minimal baseline slowing of stream. Tried tamsulosin and avodart years ago, but stopped it due to mild nature of sxs and lack of response. Post op urinary retention 2013. Nasal congestion with tamsulosin.   Started s          Past Surgical History:   Procedure Laterality Date   • ANTERIOR CERVICAL FUSION BG & INST 2 LEVEL N/A 9/24/2020     Performed by Elida García MD at [facility]   09/24/2020     Anterior Cervical fusion 4-6 discectomy consent was reviewed/signed by the patient and witnessed by a medical assistant.     The patient was prepped and draped in the supine position. Lidocaine jelly was instilled into the urethra.   After a time, the flexible cystoscope was advanced through the Cresencio Mackenzie MD at 12/15/2020 11:45 PM

## 2021-01-13 NOTE — ANESTHESIA PREPROCEDURE EVALUATION
PRE-OP EVALUATION    Patient Name: Duane Brooks    Pre-op Diagnosis: Benign prostatic hyperplasia, unspecified whether lower urinary tract symptoms present [N40.0]    Procedure(s):  TRANSURETHRAL RESECTION OF PROSTATE    Surgeon(s) and Role:     * [facility] Surgical History:   Procedure Laterality Date   • ANTERIOR CERVICAL FUSION BG & INST 2 LEVEL N/A 9/24/2020    Performed by Pete Montiel MD at [address] [place] [address]  09/24/2020    Anterior Cervical fusion 4-6 discectomy   • HERNIA SURGERY

## 2021-01-13 NOTE — ANESTHESIA PROCEDURE NOTES
Airway  Urgency: elective      General Information and Staff    Patient location during procedure: OR  Anesthesiologist: Parish Ayala MD  Resident/CRNA: Isadora Kimbrough CRNA  Performed: CRNA     Indications and Patient Condition  Indications for air

## 2021-01-14 ENCOUNTER — APPOINTMENT (OUTPATIENT)
Dept: CT IMAGING | Facility: HOSPITAL | Age: 81
DRG: 713 | End: 2021-01-14
Attending: UROLOGY
Payer: MEDICARE

## 2021-01-14 PROBLEM — R55 SYNCOPE: Status: ACTIVE | Noted: 2021-01-14

## 2021-01-14 LAB
ANION GAP SERPL CALC-SCNC: 7 MMOL/L (ref 0–18)
ANION GAP SERPL CALC-SCNC: 8 MMOL/L (ref 0–18)
BASOPHILS # BLD AUTO: 0.06 X10(3) UL (ref 0–0.2)
BASOPHILS NFR BLD AUTO: 0.5 %
BUN BLD-MCNC: 22 MG/DL (ref 7–18)
BUN BLD-MCNC: 23 MG/DL (ref 7–18)
BUN/CREAT SERPL: 20.8 (ref 10–20)
BUN/CREAT SERPL: 23 (ref 10–20)
CALCIUM BLD-MCNC: 8 MG/DL (ref 8.5–10.1)
CALCIUM BLD-MCNC: 8 MG/DL (ref 8.5–10.1)
CHLORIDE SERPL-SCNC: 96 MMOL/L (ref 98–112)
CHLORIDE SERPL-SCNC: 97 MMOL/L (ref 98–112)
CO2 SERPL-SCNC: 25 MMOL/L (ref 21–32)
CO2 SERPL-SCNC: 25 MMOL/L (ref 21–32)
CREAT BLD-MCNC: 1 MG/DL
CREAT BLD-MCNC: 1.06 MG/DL
DEPRECATED RDW RBC AUTO: 45.2 FL (ref 35.1–46.3)
EOSINOPHIL # BLD AUTO: 0 X10(3) UL (ref 0–0.7)
EOSINOPHIL NFR BLD AUTO: 0 %
ERYTHROCYTE [DISTWIDTH] IN BLOOD BY AUTOMATED COUNT: 13.1 % (ref 11–15)
GLUCOSE BLD-MCNC: 120 MG/DL (ref 70–99)
GLUCOSE BLD-MCNC: 160 MG/DL (ref 70–99)
HAV IGM SER QL: 1.6 MG/DL (ref 1.6–2.6)
HAV IGM SER QL: 1.7 MG/DL (ref 1.6–2.6)
HCT VFR BLD AUTO: 37.9 %
HGB BLD-MCNC: 12.5 G/DL
IMM GRANULOCYTES # BLD AUTO: 0.05 X10(3) UL (ref 0–1)
IMM GRANULOCYTES NFR BLD: 0.4 %
LYMPHOCYTES # BLD AUTO: 0.78 X10(3) UL (ref 1–4)
LYMPHOCYTES NFR BLD AUTO: 6.7 %
MCH RBC QN AUTO: 31.3 PG (ref 26–34)
MCHC RBC AUTO-ENTMCNC: 33 G/DL (ref 31–37)
MCV RBC AUTO: 95 FL
MONOCYTES # BLD AUTO: 0.83 X10(3) UL (ref 0.1–1)
MONOCYTES NFR BLD AUTO: 7.2 %
NEUTROPHILS # BLD AUTO: 9.84 X10 (3) UL (ref 1.5–7.7)
NEUTROPHILS # BLD AUTO: 9.84 X10(3) UL (ref 1.5–7.7)
NEUTROPHILS NFR BLD AUTO: 85.2 %
OSMOLALITY SERPL CALC.SUM OF ELEC: 273 MOSM/KG (ref 275–295)
OSMOLALITY SERPL CALC.SUM OF ELEC: 275 MOSM/KG (ref 275–295)
PLATELET # BLD AUTO: 140 10(3)UL (ref 150–450)
POTASSIUM SERPL-SCNC: 3.8 MMOL/L (ref 3.5–5.1)
POTASSIUM SERPL-SCNC: 4 MMOL/L (ref 3.5–5.1)
RBC # BLD AUTO: 3.99 X10(6)UL
SODIUM SERPL-SCNC: 129 MMOL/L (ref 136–145)
SODIUM SERPL-SCNC: 129 MMOL/L (ref 136–145)
TROPONIN I SERPL-MCNC: <0.045 NG/ML (ref ?–0.04)
WBC # BLD AUTO: 11.6 X10(3) UL (ref 4–11)

## 2021-01-14 PROCEDURE — 99222 1ST HOSP IP/OBS MODERATE 55: CPT | Performed by: OTHER

## 2021-01-14 PROCEDURE — 99223 1ST HOSP IP/OBS HIGH 75: CPT | Performed by: INTERNAL MEDICINE

## 2021-01-14 PROCEDURE — 70450 CT HEAD/BRAIN W/O DYE: CPT | Performed by: NURSE PRACTITIONER

## 2021-01-14 PROCEDURE — 99291 CRITICAL CARE FIRST HOUR: CPT | Performed by: NURSE PRACTITIONER

## 2021-01-14 RX ORDER — LORAZEPAM 2 MG/ML
INJECTION INTRAMUSCULAR
Status: DISPENSED
Start: 2021-01-14 | End: 2021-01-14

## 2021-01-14 RX ORDER — SODIUM CHLORIDE 9 MG/ML
INJECTION, SOLUTION INTRAVENOUS CONTINUOUS
Status: DISCONTINUED | OUTPATIENT
Start: 2021-01-14 | End: 2021-01-15

## 2021-01-14 RX ORDER — MAGNESIUM OXIDE 400 MG (241.3 MG MAGNESIUM) TABLET
400 TABLET ONCE
Status: COMPLETED | OUTPATIENT
Start: 2021-01-14 | End: 2021-01-14

## 2021-01-14 NOTE — CONSULTS
LMTCB.     Notes recorded by Heather Willams MD on 3/26/2019 at 11:30 AM CDT  Stool culture negative for any bacteria  +yeast but not sure if that needs to be treated in stool.  Please have her see her GI if symptoms of diarrhea persist  Patient verbalized [facility] Cardiology  Report of Consultation    Amaya Singleton Patient Status:  Observation    1940 MRN [id number]   [facility] 6NE-A Attending David Valle MD   Hosp Day # 0 PCP Marii Liu MD     Reason for Consultation:  Aminah Arguello mention of obstruction 2/7/2011    gallstones   • Diverticulosis 2011    \"had a CAT scan which showed a few diverticulosis as well as thickening of gallbladder wall\" per chart 2/7/2011   • Enlarged prostate    • Foot drop, right foot    • High cholestero HYDROcodone-acetaminophen (NORCO) 5-325 MG per tab 1 tablet, 1 tablet, Oral, Q4H PRN **OR** HYDROcodone-acetaminophen (NORCO) 5-325 MG per tab 2 tablet, 2 tablet, Oral, Q4H PRN  •  morphINE sulfate (PF) 2 MG/ML injection 1 mg, 1 mg, Intravenous, Q2H PRN ** lb (86.2 kg)  12/16/20 : 190 lb (86.2 kg)      Telemetry: During this episode showed progressive sinus bradycardia to sinus arrest with long pauses and ventricular escape. Spontaneous restoration back to normal sinus rhythm.   General: Alert and oriented i vasovagal syncope. Historically, he had syncopal episodes in his teenage years. With IV fluids and time, he is symptomatically better.   Discussed with patient and his partner diet and lifestyle strategies to minimize risk of recurrent episodes of vasovag

## 2021-01-14 NOTE — HISTORICAL OFFICE NOTE
Progress Notes  - documented in this encounter  Jose D Francisco MD - 08/20/2020 12:30 PM CDT  Formatting of this note might be different from the original.  [address] [facility]    PCP: Brenna Mota MD    Chief Complaint   Patient presents wi OINTMENT     Review of Systems  Review of Systems   Constitution: Negative for chills, fever, weight gain and weight loss. HENT: Negative for hearing loss.    Cardiovascular: Negative for chest pain, claudication, dyspnea on exertion, palpitations, paroxy GFRA, BCRAT, POTASSIUM, C02, GLUCOSE, CREATININE, GFRNA, CALCIUM, BNP, TSH in the last 8765 hours. Invalid input(s): AGAP, FST1    No results for input(s): WBC, RBC, HGB, HCT, MCV, MCHC, RDWCV, PLT, TLYMPH in the last 8765 hours.     Assessment / Plan  1

## 2021-01-14 NOTE — PLAN OF CARE
Assumed care of patient at [address] Patient transferred from floor to room 8606 due to patient going asystole during a long pause. Patient is AOX4, following commands, and DAVIS.  Patient states since surgery bilateral eyes feel \"foggy\" and having difficulty d

## 2021-01-14 NOTE — OPERATIVE REPORT
[address] Patient Status:  Hospital Outpatient Surgery    1940 MRN [id number]   Location [address] POST ANESTHESIA CARE UNIT Attending Vanessa Rebolledo MD   [facility] Day # 0 PCP Andria Meek MD     Date of Operation;   down to the verumontanum, circumferentially. The capsule was exposed in a few places. There is definitely some residual BPH tissue. After the resection however the prostatic urethra was very widely patent.   There was some venous bleeding, possibly from t

## 2021-01-14 NOTE — CONSULTS
[facility]  Neuro Critical Care Consult Note    Peewee Sumner Patient Status:  Observation    1940 MRN [id number]   [facility] 6NE-A Attending Marimar Degroot MD   [address] Day # 0 PCP Ankita Ortiz MD     Date of Admission: 20 Cervical fusion 4-6 discectomy   • HERNIA SURGERY      left inguinal hernia   • INTRAOPERATIVE NEURO MONITORING N/A 9/24/2020    Performed by Efrem Kerns MD at [facility] MAIN OR   • [address]  2/7/2011    w/ intraoperative cholangiogram   • Oral, Q4H PRN **OR** HYDROcodone-acetaminophen (NORCO) 5-325 MG per tab 2 tablet, 2 tablet, Oral, Q4H PRN  •  morphINE sulfate (PF) 2 MG/ML injection 1 mg, 1 mg, Intravenous, Q2H PRN **OR** morphINE sulfate (PF) 2 MG/ML injection 2 mg, 2 mg, Intravenous, Q Conjunctivae/corneas clear. No scleral icterus. No conjunctival hemorrhage. Neck: Soft, supple neck; trachea midline. Extremity: No edema, no cyanosis  Skin: No skin breakdown noted on exam    Neurological:   MS:The patient is alert and orientated x 3.

## 2021-01-14 NOTE — H&P
Sudden onset inability to void. Minimal baseline slowing of stream. Tried tamsulosin and avodart years ago, but stopped it due to mild nature of sxs and lack of response. Post op urinary retention 2013. Nasal congestion with tamsulosin.   Started silodosi              Past Surgical History:   Procedure Laterality Date   • ANTERIOR CERVICAL FUSION BG & INST 2 LEVEL N/A 9/24/2020     Performed by Dennis Singh MD at [address] [place] [address]   09/24/2020     Anterior Cervical fusion 4-6 discec consent was reviewed/signed by the patient and witnessed by a medical assistant.     The patient was prepped and draped in the supine position.  Lidocaine jelly was instilled into the urethra.  After a time, the flexible cystoscope was advanced through the Lucero Corado MD at 12/15/2020 11:45 PM               Electronically signed by Lucero Corado MD at 1/13/2021  3:51 PM

## 2021-01-14 NOTE — CONSULTS
[address] [facility] Cardiology Consultation Note Joaquim Deshpande MD    The patient has been seen by the [facility] cardiology group. I discussed case with Dr. Corky Fernandes and will transfer care to that group.         Manjeet Desir MD  1/14/2021  10:01 AM

## 2021-01-14 NOTE — PLAN OF CARE
Assumed care at 2030. Patient came from PACU for TURP procedure and has a CBI in place. Pt denies any pain or pressure near procedure site, output is pink-red tinged, clear and no clots noted upon assessment and rounds.  Site was C/D/I with minimal na emergency contact. 0000 Gave Report to [employer]. Patient left unit in stable condition and left with all belongings.

## 2021-01-14 NOTE — PROGRESS NOTES
[facility]  Neurocritical Care APRN Progress Note    NAME: Nate Coburn - ROOM: 5451/9057-[id number] - MRN: [id number] - Age: 79year old - :1940    History Of Present Illness:  Nate Coburn is a 79year old male with PMHx signif Denies headache, recent illness, chest pain, or dyspnea.     PMH:  Past Medical History:   Diagnosis Date   • Atherosclerosis 1/21/2011   • Back problem     rt drop foot   • BPH (benign prostatic hyperplasia)    • Calculus of gallbladder with other cholecys midline, no carotid bruits, adenopathy, or JVD  Lungs: Clear to auscultation bilaterally, respirations unlabored  Heart: Regular rate and rhythm, S1 and S2 normal, no murmur, rub or gallop  Abdomen: Soft, non-tender, bowel sounds active all four quadrants, event and syncope  -Appears precipitated by vagal response to nausea  -CT brain now  -EEG in AM  -Doubt true seizure activity--hold on medication for now  -Monitor for neuro changes--Nq2h for tonight  -Neuro Critical Care consulted    2.   Cardiac pause wit

## 2021-01-14 NOTE — PROGRESS NOTES
[facility]  Urology Progress Note    Martinez Harrell Patient Status:  Observation    1940 MRN [id number]   [facility] 6NE-A Attending Quentin Gilmore MD   [facility] Day # 0 PCP Sherman Bateman MD     Subjective:  Martinez Harrell is a(n) CO2 25.0 01/14/2021     01/14/2021    CA 8.0 01/14/2021    MG 1.6 01/13/2021    TROP <0.045 01/14/2021    PGLU 132 01/13/2021       Assessment:    POD #1 following cystoscopy, TURP  Afebrile, VSS  WBC 11.6  Hgb 12.5  Creat 1    Plan:    -Clears

## 2021-01-14 NOTE — ANESTHESIA POSTPROCEDURE EVALUATION
[address] Patient Status:  Hospital Outpatient Surgery   Age/Gender 79year old male MRN [id number]   [facility] SURGERY Attending Jailyn Quinn MD   Hosp Day # 0 PCP Gregory Corrigan MD       Anesthesia Post-op Note

## 2021-01-14 NOTE — CONSULTS
General Medicine Consult      Reason for consult: post-op medical management     Consulted by: Dr. Leeann Lane    PCP: Andria Meek MD      History of Present Illness: Patient is a 79year old male with PMH sig for right foot drop, BPH, urinary retention, neurop Oral Tab per tablet, Take 1 tablet by mouth 2 (two) times daily for 7 days. Start on day prior to surgery, Disp: 6 tablet, Rfl: 0    •  Cholecalciferol (VITAMIN D) 50 MCG (2000 UT) Oral Tab, Take 1 tablet by mouth daily.   , Disp: , Rfl:     •  aspirin 81 M RRR  GI: soft/NT/ND +BS  Ext: nonedematous b/l LE, warm to touch  Neuro: moving all extremities, CN intact, visual fields intact bilaterally  Psych: normal mood, normal affect  Skin: no rashes, no lesions    Diagnostics:   CBC/Chem  No results for input(s) parameters    # HLD  - home meds    Outpatient records or previous hospital records reviewed. Appreciate this consultation. [facility] hospitalist to continue to follow patient while in house. Please call with any questions or concerns.      Aden Fowler  In

## 2021-01-14 NOTE — PROGRESS NOTES
[address] Hospitalist Progress Note     Scooter Peguero Patient Status:  Observation    1940 MRN [id number]   [facility] 6NE-A Attending Mack Hernandez MD   Hosp Day # 0 PCP Teofilo Richards MD     CC: follow up    24hr events/SUBJECTIVE:  V **OR** HYDROcodone-acetaminophen **OR** HYDROcodone-acetaminophen, morphINE sulfate **OR** morphINE sulfate **OR** morphINE sulfate, diphenhydrAMINE **OR** diphenhydrAMINE HCl, PEG 3350, magnesium hydroxide, bisacodyl, Fleet Enema, ondansetron **OR** ondan

## 2021-01-15 ENCOUNTER — NURSE ONLY (OUTPATIENT)
Dept: ELECTROPHYSIOLOGY | Facility: HOSPITAL | Age: 81
DRG: 713 | End: 2021-01-15
Attending: HOSPITALIST
Payer: MEDICARE

## 2021-01-15 VITALS
BODY MASS INDEX: 26.01 KG/M2 | OXYGEN SATURATION: 96 % | WEIGHT: 192 LBS | HEIGHT: 72 IN | DIASTOLIC BLOOD PRESSURE: 52 MMHG | RESPIRATION RATE: 16 BRPM | HEART RATE: 75 BPM | TEMPERATURE: 98 F | SYSTOLIC BLOOD PRESSURE: 136 MMHG

## 2021-01-15 LAB
ANION GAP SERPL CALC-SCNC: 3 MMOL/L (ref 0–18)
ATRIAL RATE: 82 BPM
BUN BLD-MCNC: 19 MG/DL (ref 7–18)
BUN/CREAT SERPL: 19.6 (ref 10–20)
CALCIUM BLD-MCNC: 8.2 MG/DL (ref 8.5–10.1)
CHLORIDE SERPL-SCNC: 109 MMOL/L (ref 98–112)
CO2 SERPL-SCNC: 29 MMOL/L (ref 21–32)
CREAT BLD-MCNC: 0.97 MG/DL
GLUCOSE BLD-MCNC: 100 MG/DL (ref 70–99)
HAV IGM SER QL: 2.2 MG/DL (ref 1.6–2.6)
OSMOLALITY SERPL CALC.SUM OF ELEC: 294 MOSM/KG (ref 275–295)
P AXIS: 40 DEGREES
P-R INTERVAL: 154 MS
POTASSIUM SERPL-SCNC: 4.1 MMOL/L (ref 3.5–5.1)
Q-T INTERVAL: 404 MS
QRS DURATION: 118 MS
QTC CALCULATION (BEZET): 472 MS
R AXIS: -52 DEGREES
SODIUM SERPL-SCNC: 141 MMOL/L (ref 136–145)
T AXIS: 62 DEGREES
VENTRICULAR RATE: 82 BPM

## 2021-01-15 PROCEDURE — 95816 EEG AWAKE AND DROWSY: CPT | Performed by: OTHER

## 2021-01-15 PROCEDURE — 99232 SBSQ HOSP IP/OBS MODERATE 35: CPT | Performed by: INTERNAL MEDICINE

## 2021-01-15 RX ORDER — PSEUDOEPHEDRINE HCL 30 MG
100 TABLET ORAL 2 TIMES DAILY
Qty: 30 CAPSULE | Refills: 0 | Status: SHIPPED | OUTPATIENT
Start: 2021-01-15 | End: 2022-02-07

## 2021-01-15 NOTE — PROGRESS NOTES
[facility] Progress Note    Subjective:  Feels well. No further spells, feels back to baseline. Voiding well.   He was quite nauseated after this surgery and felt it was from the anesthesia, all those symptoms have resolved and he is tolerating a · Follow up with Dr Bryson Vega in our [facility] office after discharge. He lives in [place] [place], and if more convenient I would be happy to see him in our office here for follow up. Donna Guerra, APRN  1/15/2021  10:59 AM    Seen and examined.   Ag

## 2021-01-15 NOTE — PAYOR COMM NOTE
Appropriate for inpatient status per guidelines for asystole and seizure like activity post TURP.      --------------  ADMISSION REVIEW     Payor: [address] [place] [address] #:  [id number]  Authorization Number: [id number]         H&P - H&P Note wall\" per chart 2/7/2011   • Enlarged prostate     • Foot drop, right foot     • High cholesterol     • Neuropathy       both hands; toes in right foot   • Phlebolith 1/21/2011     pelvic phleboliths   • Skin cancer       right temple   • Visual impairmen instilled into the urethra.  After a time, the flexible cystoscope was advanced through the urethra under direct vision.  There were no anterior urethral lesions.  The prostate was moderately enlarged with  a PUL of 4cm and 2cm intravesical extension median Diagnosis:   Urinary Retention     Post-Op Diagnosis:   Urinary Retention          1/13 HOSP    ASSESSMENT / PLAN:     # s/p TURP  - bladder irrigation per urology  - appreciate management  - check labs in AM  - PT/OT     # HTN  - no hx  - may be post anae No further currently from our perspective please call any question concerns. 1/14 CARDS    Sinus bradycardia and sinus arrest with prolonged ventricular pauses and ventricular escape beats, precipitated by Grigsby catheter manipulation and nausea.   Po brain without acute process  - EEG ordered     # HTN, bradycardia  - cardiology consult as discussed above  - monitor, prn medications ordered with parameters     # HLD  - home meds     # hyponatremia  - Na 129, stable from yesterday  - suspect component o

## 2021-01-15 NOTE — PROGRESS NOTES
[facility]    Progress Note    Raymond Fisher Patient Status:  Inpatient    1940 MRN [id number]   [facility] 7NE-A Attending Vanessa Rebolledo MD   [facility] Day # 1 PCP Andria Meek MD         Subjective:   Raymond Fisher is a(n) 79 acute infarct is of high clinical concern, recommend MRI for further evaluation. Clinical service notified of these findings with preliminary radiology report from [facility] services.     Dictated by (CST): Harry Carey MD on 1/14/2021 at 6:00 AM     Cholo Castillo

## 2021-01-15 NOTE — PROGRESS NOTES
Assumed care of patient when transferred to floor. Patient orientated to room and unit. Pt A/O x 4, VSS, RA, denies any pain, discomfort, or SOB. Grigsby draining pink urine, very small blood clots noted. CBI discontinued prior to transfer.   Patient

## 2021-01-15 NOTE — PLAN OF CARE
Assumed care at [address]. Denies pain/discomfort at this time. Grigsby in place draining fruit punch colored urine. Very small clots noted. CBI clamped. Urine progressively getting darker throughout shift. Urine \"hawaiian punch\" colored. No clots.  Draining

## 2021-01-15 NOTE — DISCHARGE SUMMARY
[facility]    Discharge Summary    Chucky Valerio Patient Status:  Inpatient    1940 MRN [id number]   [facility] 7NE-A Attending Coreen Daniels MD   [facility] Day # 1 PCP Brigida Ortega MD     Date of Admission: 2021 Dis[facility]itio Prescription details   acetaminophen 500 MG Tabs  Commonly known as: TYLENOL EXTRA STRENGTH      Take 1,000 mg by mouth one time. Refills: 0     aspirin 81 MG Tbec      Take 81 mg by mouth daily.    Refills: 0     atorvastatin 10 MG Tabs  Commonly known a

## 2021-01-15 NOTE — PROGRESS NOTES
[facility] Hospitalist Progress Note     Magen Johnson Patient Status:  Observation    1940 MRN [id number]   [facility] 6NE-A Attending Lili Lugo MD   Hosp Day # 1 PCP Ana Luisa Matthews MD     CC: follow up    24hr events/SUBJECTIVE:  N Chloride, Belladonna Alkaloids-Opium, Grigsby / urology care **AND** sodium chloride       Microbiology:    No results found for this visit on 01/13/21.     Lab Results   Component Value Date    COVID19 Not Detected 01/11/2021    COVID19 Not Detected 11/12/20

## 2021-01-15 NOTE — PROCEDURES
[facility] - ELECTROENCEPHALOGRAM (EEG) REPORT  Patient Name:  Raymond Fisher   MRN / CSN:  [id number] / [id number]   Date of Birth / Age:  11/28/1940 /  79year old   Encounter Date:  1/15/21         METHODS:  Twenty-two electrodes were applied according to the

## 2021-01-16 NOTE — PROGRESS NOTES
NURSING DISCHARGE NOTE    Discharge AVS completed. Patient cleared to discharge per all consults. Discharge orders and instructions given and reviewed with patient and girlfriend Dilcia Ghosh. All questions answered, verbalized understanding.   Signs and sympt

## 2021-01-16 NOTE — PLAN OF CARE
Assumed care of patient at 07:15 am.   Pt A/O x 4, VSS, RA, denies any pain, discomfort or SOB. Grigsby discontinued. Voiding Hawaiian Punch colored urine with very small blood clots noted. Dr. Jacob Shay aware. Patient passed BM without difficulty.   ECG and E

## 2021-01-18 NOTE — PAYOR COMM NOTE
--------------  DISCHARGE REVIEW    Payor: [facility] Will Riddle #:  [id number]  Authorization Number: [id number]    Admit date: 1/14/21  Admit time:  1016  Discharge Date: 1/15/2021  6:41 PM     Admitting Physician: Cherry Macias MD  [place]

## 2021-01-27 ENCOUNTER — OFFICE VISIT (OUTPATIENT)
Dept: CARDIOLOGY | Age: 81
End: 2021-01-27

## 2021-01-27 VITALS
HEIGHT: 72 IN | RESPIRATION RATE: 16 BRPM | SYSTOLIC BLOOD PRESSURE: 142 MMHG | HEART RATE: 100 BPM | DIASTOLIC BLOOD PRESSURE: 72 MMHG | BODY MASS INDEX: 25.47 KG/M2 | WEIGHT: 188 LBS

## 2021-01-27 DIAGNOSIS — R55 SYNCOPE AND COLLAPSE: ICD-10-CM

## 2021-01-27 DIAGNOSIS — Z09 HOSPITAL DISCHARGE FOLLOW-UP: Primary | ICD-10-CM

## 2021-01-27 PROCEDURE — 99214 OFFICE O/P EST MOD 30 MIN: CPT | Performed by: NURSE PRACTITIONER

## 2021-01-27 ASSESSMENT — PATIENT HEALTH QUESTIONNAIRE - PHQ9
1. LITTLE INTEREST OR PLEASURE IN DOING THINGS: NOT AT ALL
CLINICAL INTERPRETATION OF PHQ2 SCORE: NO FURTHER SCREENING NEEDED
SUM OF ALL RESPONSES TO PHQ9 QUESTIONS 1 AND 2: 0
CLINICAL INTERPRETATION OF PHQ9 SCORE: NO FURTHER SCREENING NEEDED
SUM OF ALL RESPONSES TO PHQ9 QUESTIONS 1 AND 2: 0
2. FEELING DOWN, DEPRESSED OR HOPELESS: NOT AT ALL

## 2021-01-27 ASSESSMENT — ENCOUNTER SYMPTOMS
BRUISES/BLEEDS EASILY: 0
ALLERGIC/IMMUNOLOGIC COMMENTS: NO NEW FOOD ALLERGIES
WEIGHT GAIN: 0
FEVER: 0
CHILLS: 0
HEMOPTYSIS: 0
COUGH: 0
WEIGHT LOSS: 0
HEMATOCHEZIA: 0
SUSPICIOUS LESIONS: 0
CONSTIPATION: 1

## 2021-02-15 ENCOUNTER — ANCILLARY PROCEDURE (OUTPATIENT)
Dept: CARDIOLOGY | Age: 81
End: 2021-02-15
Attending: NURSE PRACTITIONER

## 2021-02-15 DIAGNOSIS — R55 SYNCOPE AND COLLAPSE: ICD-10-CM

## 2021-02-15 PROCEDURE — 93306 TTE W/DOPPLER COMPLETE: CPT | Performed by: INTERNAL MEDICINE

## 2021-02-16 PROCEDURE — 93224 XTRNL ECG REC UP TO 48 HRS: CPT | Performed by: INTERNAL MEDICINE

## 2021-02-17 ENCOUNTER — TELEPHONE (OUTPATIENT)
Dept: CARDIOLOGY | Age: 81
End: 2021-02-17

## 2021-03-17 ENCOUNTER — OFFICE VISIT (OUTPATIENT)
Dept: CARDIOLOGY | Age: 81
End: 2021-03-17

## 2021-03-17 VITALS
SYSTOLIC BLOOD PRESSURE: 138 MMHG | DIASTOLIC BLOOD PRESSURE: 78 MMHG | BODY MASS INDEX: 25.4 KG/M2 | HEIGHT: 72 IN | RESPIRATION RATE: 16 BRPM | HEART RATE: 64 BPM | WEIGHT: 187.5 LBS

## 2021-03-17 DIAGNOSIS — R55 SYNCOPE AND COLLAPSE: Primary | ICD-10-CM

## 2021-03-17 PROCEDURE — 99212 OFFICE O/P EST SF 10 MIN: CPT | Performed by: NURSE PRACTITIONER

## 2021-03-17 SDOH — HEALTH STABILITY: PHYSICAL HEALTH: ON AVERAGE, HOW MANY MINUTES DO YOU ENGAGE IN EXERCISE AT THIS LEVEL?: 0 MIN

## 2021-03-17 SDOH — HEALTH STABILITY: PHYSICAL HEALTH: ON AVERAGE, HOW MANY DAYS PER WEEK DO YOU ENGAGE IN MODERATE TO STRENUOUS EXERCISE (LIKE A BRISK WALK)?: 0 DAYS

## 2021-03-17 ASSESSMENT — ENCOUNTER SYMPTOMS
HEMOPTYSIS: 0
SUSPICIOUS LESIONS: 0
WEIGHT GAIN: 0
CHILLS: 0
HEMATOCHEZIA: 0
ALLERGIC/IMMUNOLOGIC COMMENTS: NO NEW FOOD ALLERGIES
WEIGHT LOSS: 0
FEVER: 0
BRUISES/BLEEDS EASILY: 0
COUGH: 0

## 2021-03-17 ASSESSMENT — PATIENT HEALTH QUESTIONNAIRE - PHQ9
1. LITTLE INTEREST OR PLEASURE IN DOING THINGS: NOT AT ALL
SUM OF ALL RESPONSES TO PHQ9 QUESTIONS 1 AND 2: 0
SUM OF ALL RESPONSES TO PHQ9 QUESTIONS 1 AND 2: 0
2. FEELING DOWN, DEPRESSED OR HOPELESS: NOT AT ALL
CLINICAL INTERPRETATION OF PHQ2 SCORE: NO FURTHER SCREENING NEEDED
CLINICAL INTERPRETATION OF PHQ9 SCORE: NO FURTHER SCREENING NEEDED

## 2021-04-01 ENCOUNTER — HOSPITAL ENCOUNTER (OUTPATIENT)
Dept: MRI IMAGING | Age: 81
Discharge: HOME | End: 2021-04-01
Attending: UROLOGY
Payer: MEDICARE

## 2021-04-01 DIAGNOSIS — N45.2 ORCHITIS: ICD-10-CM

## 2021-04-01 DIAGNOSIS — R29.898 WEAKNESS OF LEFT LEG: ICD-10-CM

## 2021-04-01 PROCEDURE — 72158 MRI LUMBAR SPINE W/O & W/DYE: CPT | Performed by: UROLOGY

## 2021-04-01 PROCEDURE — A9575 INJ GADOTERATE MEGLUMI 0.1ML: HCPCS | Performed by: UROLOGY

## 2021-04-01 PROCEDURE — 82565 ASSAY OF CREATININE: CPT

## 2021-04-22 ENCOUNTER — TELEPHONE (OUTPATIENT)
Dept: SURGERY | Facility: CLINIC | Age: 81
End: 2021-04-22

## 2021-04-22 NOTE — TELEPHONE ENCOUNTER
Patient just completed an MRI cervical  that was ordered by a different doctor. Patient is wondering if Dr. Chica Pickett could take a look at it and let him know what he things. Regarding his Nonah Denton.

## 2021-04-26 ENCOUNTER — OFFICE VISIT (OUTPATIENT)
Dept: SURGERY | Facility: CLINIC | Age: 81
End: 2021-04-26
Payer: COMMERCIAL

## 2021-04-26 VITALS
BODY MASS INDEX: 25.47 KG/M2 | WEIGHT: 188 LBS | SYSTOLIC BLOOD PRESSURE: 112 MMHG | DIASTOLIC BLOOD PRESSURE: 60 MMHG | HEIGHT: 72 IN | HEART RATE: 71 BPM

## 2021-04-26 DIAGNOSIS — M47.26 OTHER SPONDYLOSIS WITH RADICULOPATHY, LUMBAR REGION: ICD-10-CM

## 2021-04-26 DIAGNOSIS — M47.22 CERVICAL SPONDYLOSIS WITH MYELOPATHY AND RADICULOPATHY: ICD-10-CM

## 2021-04-26 DIAGNOSIS — M47.12 CERVICAL SPONDYLOSIS WITH MYELOPATHY AND RADICULOPATHY: ICD-10-CM

## 2021-04-26 DIAGNOSIS — Z98.1 S/P CERVICAL SPINAL FUSION: Primary | ICD-10-CM

## 2021-04-26 PROCEDURE — 3078F DIAST BP <80 MM HG: CPT | Performed by: NEUROLOGICAL SURGERY

## 2021-04-26 PROCEDURE — 99214 OFFICE O/P EST MOD 30 MIN: CPT | Performed by: NEUROLOGICAL SURGERY

## 2021-04-26 PROCEDURE — 3008F BODY MASS INDEX DOCD: CPT | Performed by: NEUROLOGICAL SURGERY

## 2021-04-26 PROCEDURE — 3074F SYST BP LT 130 MM HG: CPT | Performed by: NEUROLOGICAL SURGERY

## 2021-04-26 NOTE — PROGRESS NOTES
[facility]  Neurological Surgery Follow Up Clinic Note    Callum Blount 79year old, male    1940 MRN [id number]   PCP Alyssia Encarnacion MD Allergies No Known Allergies     SUBJECTIVE:  Aroldo is s/p C4-5 and C5-6 anterior cervi bilaterally  3-4 beats non sustained clonus on the right     Sensory: vibration absent at the right great toe, present elsewhere     Coordination: no dysmetria with finger-to-nose bilaterally, heel-to-shin normal    IMAGING:  MRI brain dated 4/21/2021:  No start ascending sessions for in the near future. 3. We will see the patient in 1 month's time. This will be enough time to see if there is significant improvement while in therapy.   He will also obtain x-rays of the cervical spine to monitor the fusion

## 2021-04-26 NOTE — PROGRESS NOTES
Patient here for follow-up and MRI review. Had MRI done earlier this month because urologist strongly suggested to get one done due to diffulty walking.

## 2021-05-19 ENCOUNTER — TELEPHONE (OUTPATIENT)
Dept: SURGERY | Facility: CLINIC | Age: 81
End: 2021-05-19

## 2021-05-19 DIAGNOSIS — M54.50 LUMBAR PAIN: Primary | ICD-10-CM

## 2021-05-19 NOTE — TELEPHONE ENCOUNTER
Returned patient's call and informed him per LOV, pt was to complete xray of cervical spine 1 month out from last office visit. Explained the reason Xray is needed in addition to the MRI. PLAN:  1.  Is difficult to evaluate the cervical spine with

## 2021-05-19 NOTE — TELEPHONE ENCOUNTER
Pt calling to see if he still needs to get cervical xray done that Dr. Laxmi Burgess ordered since he has had an MRI. Pt has 5.26 appt with Dr. Laxmi Burgess. Pt is also concerned that his left leg will start to \"drop\". Please call pt to advise.

## 2021-05-21 ENCOUNTER — HOSPITAL ENCOUNTER (OUTPATIENT)
Dept: GENERAL RADIOLOGY | Age: 81
Discharge: HOME | End: 2021-05-21
Attending: NEUROLOGICAL SURGERY
Payer: MEDICARE

## 2021-05-21 ENCOUNTER — HOSPITAL ENCOUNTER (OUTPATIENT)
Dept: GENERAL RADIOLOGY | Age: 81
Discharge: HOME | End: 2021-05-21
Attending: PHYSICIAN ASSISTANT
Payer: MEDICARE

## 2021-05-21 DIAGNOSIS — M47.12 CERVICAL SPONDYLOSIS WITH MYELOPATHY AND RADICULOPATHY: ICD-10-CM

## 2021-05-21 DIAGNOSIS — M47.22 CERVICAL SPONDYLOSIS WITH MYELOPATHY AND RADICULOPATHY: ICD-10-CM

## 2021-05-21 DIAGNOSIS — Z98.1 S/P CERVICAL SPINAL FUSION: ICD-10-CM

## 2021-05-21 DIAGNOSIS — M54.50 LUMBAR PAIN: ICD-10-CM

## 2021-05-21 PROCEDURE — 72050 X-RAY EXAM NECK SPINE 4/5VWS: CPT | Performed by: NEUROLOGICAL SURGERY

## 2021-05-21 PROCEDURE — 72110 X-RAY EXAM L-2 SPINE 4/>VWS: CPT | Performed by: PHYSICIAN ASSISTANT

## 2021-05-26 ENCOUNTER — OFFICE VISIT (OUTPATIENT)
Dept: SURGERY | Facility: CLINIC | Age: 81
End: 2021-05-26
Payer: COMMERCIAL

## 2021-05-26 VITALS
DIASTOLIC BLOOD PRESSURE: 74 MMHG | BODY MASS INDEX: 25.47 KG/M2 | WEIGHT: 188 LBS | SYSTOLIC BLOOD PRESSURE: 140 MMHG | HEIGHT: 72 IN

## 2021-05-26 DIAGNOSIS — Z98.1 S/P CERVICAL SPINAL FUSION: Primary | ICD-10-CM

## 2021-05-26 DIAGNOSIS — M47.816 LUMBAR SPONDYLOSIS: ICD-10-CM

## 2021-05-26 DIAGNOSIS — M41.9 SCOLIOSIS OF LUMBAR SPINE, UNSPECIFIED SCOLIOSIS TYPE: ICD-10-CM

## 2021-05-26 PROCEDURE — 3077F SYST BP >= 140 MM HG: CPT | Performed by: NEUROLOGICAL SURGERY

## 2021-05-26 PROCEDURE — 3008F BODY MASS INDEX DOCD: CPT | Performed by: NEUROLOGICAL SURGERY

## 2021-05-26 PROCEDURE — 99214 OFFICE O/P EST MOD 30 MIN: CPT | Performed by: NEUROLOGICAL SURGERY

## 2021-05-26 PROCEDURE — 3078F DIAST BP <80 MM HG: CPT | Performed by: NEUROLOGICAL SURGERY

## 2021-05-26 NOTE — PROGRESS NOTES
[facility]  Neurological Surgery Follow Up Clinic Note    Scooter Peguero 79year old, male    1940 MRN [id number]   PCP Teofilo Richards MD Allergies No Known Allergies     SUBJECTIVE:  Aroldo is s/p C4-5 and C5-6 anterior cervi  No acute fractures or osseous lesions are identified.  No prevertebral soft tissue swelling.  Multilevel degenerative disc disease.  Stable anterior cervical fusion of   C4-C6.      Lumbar x-rays I dated 5/21/2021:  There is mild levoconvex curvature of th

## 2021-09-02 ENCOUNTER — TELEPHONE (OUTPATIENT)
Dept: SURGERY | Facility: CLINIC | Age: 81
End: 2021-09-02

## 2021-09-02 DIAGNOSIS — Z98.1 S/P CERVICAL SPINAL FUSION: Primary | ICD-10-CM

## 2021-09-02 NOTE — TELEPHONE ENCOUNTER
Patient would like to speak with Nurse, patient is wanting to know if he should keep his upcoming apt on 9/8 and if so he is wanting to know if there is any imaging he should have done before this appointment. Please contact to advise.

## 2021-09-02 NOTE — TELEPHONE ENCOUNTER
S: Patient would like to speak with Nurse, patient is wanting to know if he should keep his upcoming apt on 9/8 and if so he is wanting to know if there is any imaging he should have done before this appointment.  Please contact to advise      B: Pt had ant

## 2021-09-02 NOTE — TELEPHONE ENCOUNTER
Called pt and informed that provider would like him to complete imaging order prior to his next appt.  Pt VU and appreciative of call

## 2021-09-03 ENCOUNTER — HOSPITAL ENCOUNTER (OUTPATIENT)
Dept: GENERAL RADIOLOGY | Age: 81
Discharge: HOME | End: 2021-09-03
Attending: NEUROLOGICAL SURGERY
Payer: MEDICARE

## 2021-09-03 DIAGNOSIS — Z98.1 S/P CERVICAL SPINAL FUSION: ICD-10-CM

## 2021-09-03 PROCEDURE — 72050 X-RAY EXAM NECK SPINE 4/5VWS: CPT | Performed by: NEUROLOGICAL SURGERY

## 2021-09-08 ENCOUNTER — OFFICE VISIT (OUTPATIENT)
Dept: SURGERY | Facility: CLINIC | Age: 81
End: 2021-09-08
Payer: COMMERCIAL

## 2021-09-08 VITALS
SYSTOLIC BLOOD PRESSURE: 126 MMHG | DIASTOLIC BLOOD PRESSURE: 78 MMHG | WEIGHT: 188 LBS | BODY MASS INDEX: 25.47 KG/M2 | HEIGHT: 72 IN

## 2021-09-08 DIAGNOSIS — Z98.1 S/P CERVICAL SPINAL FUSION: Primary | ICD-10-CM

## 2021-09-08 PROCEDURE — 99213 OFFICE O/P EST LOW 20 MIN: CPT | Performed by: NEUROLOGICAL SURGERY

## 2021-09-08 PROCEDURE — 3008F BODY MASS INDEX DOCD: CPT | Performed by: NEUROLOGICAL SURGERY

## 2021-09-08 PROCEDURE — 3078F DIAST BP <80 MM HG: CPT | Performed by: NEUROLOGICAL SURGERY

## 2021-09-08 PROCEDURE — 3074F SYST BP LT 130 MM HG: CPT | Performed by: NEUROLOGICAL SURGERY

## 2021-09-08 NOTE — PROGRESS NOTES
[facility]  Neurological Surgery Follow Up Clinic Note    Ruslan Wui 79year old, male    1940 MRN [id number]   PCP Chrystal Escobedo MD Allergies No Known Allergies     SUBJECTIVE:  Aroldo is s/p C4-5 and C5-6 anterior cervi

## 2022-02-07 ENCOUNTER — OFFICE VISIT (OUTPATIENT)
Dept: SURGERY | Facility: CLINIC | Age: 82
End: 2022-02-07
Payer: COMMERCIAL

## 2022-02-07 ENCOUNTER — HOSPITAL ENCOUNTER (OUTPATIENT)
Dept: GENERAL RADIOLOGY | Facility: HOSPITAL | Age: 82
Discharge: HOME | End: 2022-02-07
Attending: NEUROLOGICAL SURGERY
Payer: MEDICARE

## 2022-02-07 VITALS
WEIGHT: 188 LBS | DIASTOLIC BLOOD PRESSURE: 90 MMHG | HEIGHT: 72 IN | BODY MASS INDEX: 25.47 KG/M2 | SYSTOLIC BLOOD PRESSURE: 130 MMHG

## 2022-02-07 DIAGNOSIS — Z98.1 S/P CERVICAL SPINAL FUSION: Primary | ICD-10-CM

## 2022-02-07 DIAGNOSIS — Z98.1 S/P CERVICAL SPINAL FUSION: ICD-10-CM

## 2022-02-07 PROCEDURE — 72052 X-RAY EXAM NECK SPINE 6/>VWS: CPT | Performed by: NEUROLOGICAL SURGERY

## 2022-02-07 PROCEDURE — 99213 OFFICE O/P EST LOW 20 MIN: CPT | Performed by: NEUROLOGICAL SURGERY

## 2022-02-07 PROCEDURE — 3075F SYST BP GE 130 - 139MM HG: CPT | Performed by: NEUROLOGICAL SURGERY

## 2022-02-07 PROCEDURE — 3080F DIAST BP >= 90 MM HG: CPT | Performed by: NEUROLOGICAL SURGERY

## 2022-02-07 PROCEDURE — 3008F BODY MASS INDEX DOCD: CPT | Performed by: NEUROLOGICAL SURGERY
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
## 2022-11-18 ENCOUNTER — APPOINTMENT (OUTPATIENT)
Dept: GENERAL RADIOLOGY | Facility: HOSPITAL | Age: 82
End: 2022-11-18
Payer: MEDICARE

## 2022-11-18 ENCOUNTER — HOSPITAL ENCOUNTER (EMERGENCY)
Facility: HOSPITAL | Age: 82
Discharge: HOME | End: 2022-11-18
Attending: EMERGENCY MEDICINE
Payer: MEDICARE

## 2022-11-18 VITALS
HEART RATE: 94 BPM | TEMPERATURE: 100 F | OXYGEN SATURATION: 95 % | DIASTOLIC BLOOD PRESSURE: 70 MMHG | BODY MASS INDEX: 26.28 KG/M2 | HEIGHT: 72 IN | RESPIRATION RATE: 22 BRPM | SYSTOLIC BLOOD PRESSURE: 154 MMHG | WEIGHT: 194 LBS

## 2022-11-18 DIAGNOSIS — U07.1 COVID-19: Primary | ICD-10-CM

## 2022-11-18 LAB
ALBUMIN SERPL-MCNC: 3.6 G/DL (ref 3.4–5)
ALBUMIN/GLOB SERPL: 1.1 {RATIO} (ref 1–2)
ALP LIVER SERPL-CCNC: 81 U/L
ALT SERPL-CCNC: 27 U/L
ANION GAP SERPL CALC-SCNC: 5 MMOL/L (ref 0–18)
AST SERPL-CCNC: 22 U/L (ref 15–37)
ATRIAL RATE: 94 BPM
BASOPHILS # BLD AUTO: 0.06 X10(3) UL (ref 0–0.2)
BASOPHILS NFR BLD AUTO: 1 %
BILIRUB SERPL-MCNC: 0.7 MG/DL (ref 0.1–2)
BILIRUB UR QL STRIP.AUTO: NEGATIVE
BUN BLD-MCNC: 13 MG/DL (ref 7–18)
CALCIUM BLD-MCNC: 8.8 MG/DL (ref 8.5–10.1)
CHLORIDE SERPL-SCNC: 108 MMOL/L (ref 98–112)
CLARITY UR REFRACT.AUTO: CLEAR
CO2 SERPL-SCNC: 24 MMOL/L (ref 21–32)
COLOR UR AUTO: YELLOW
CREAT BLD-MCNC: 0.98 MG/DL
EOSINOPHIL # BLD AUTO: 0.05 X10(3) UL (ref 0–0.7)
EOSINOPHIL NFR BLD AUTO: 0.8 %
ERYTHROCYTE [DISTWIDTH] IN BLOOD BY AUTOMATED COUNT: 13.8 %
GFR SERPLBLD BASED ON 1.73 SQ M-ARVRAT: 78 ML/MIN/1.73M2 (ref 60–?)
GLOBULIN PLAS-MCNC: 3.3 G/DL (ref 2.8–4.4)
GLUCOSE BLD-MCNC: 144 MG/DL (ref 70–99)
GLUCOSE UR STRIP.AUTO-MCNC: NEGATIVE MG/DL
HCT VFR BLD AUTO: 42 %
HGB BLD-MCNC: 14.3 G/DL
IMM GRANULOCYTES # BLD AUTO: 0.02 X10(3) UL (ref 0–1)
IMM GRANULOCYTES NFR BLD: 0.3 %
KETONES UR STRIP.AUTO-MCNC: 20 MG/DL
LEUKOCYTE ESTERASE UR QL STRIP.AUTO: NEGATIVE
LYMPHOCYTES # BLD AUTO: 0.29 X10(3) UL (ref 1–4)
LYMPHOCYTES NFR BLD AUTO: 4.8 %
MCH RBC QN AUTO: 32.2 PG (ref 26–34)
MCHC RBC AUTO-ENTMCNC: 34 G/DL (ref 31–37)
MCV RBC AUTO: 94.6 FL
MONOCYTES # BLD AUTO: 0.84 X10(3) UL (ref 0.1–1)
MONOCYTES NFR BLD AUTO: 13.8 %
NEUTROPHILS # BLD AUTO: 4.82 X10 (3) UL (ref 1.5–7.7)
NEUTROPHILS # BLD AUTO: 4.82 X10(3) UL (ref 1.5–7.7)
NEUTROPHILS NFR BLD AUTO: 79.3 %
NITRITE UR QL STRIP.AUTO: NEGATIVE
OSMOLALITY SERPL CALC.SUM OF ELEC: 287 MOSM/KG (ref 275–295)
P AXIS: 67 DEGREES
P-R INTERVAL: 160 MS
PH UR STRIP.AUTO: 5 [PH] (ref 5–8)
PLATELET # BLD AUTO: 131 10(3)UL (ref 150–450)
POTASSIUM SERPL-SCNC: 3.9 MMOL/L (ref 3.5–5.1)
PROT SERPL-MCNC: 6.9 G/DL (ref 6.4–8.2)
PROT UR STRIP.AUTO-MCNC: NEGATIVE MG/DL
Q-T INTERVAL: 372 MS
QRS DURATION: 116 MS
QTC CALCULATION (BEZET): 465 MS
R AXIS: -59 DEGREES
RBC # BLD AUTO: 4.44 X10(6)UL
RBC UR QL AUTO: NEGATIVE
SARS-COV-2 RNA RESP QL NAA+PROBE: DETECTED
SODIUM SERPL-SCNC: 137 MMOL/L (ref 136–145)
SP GR UR STRIP.AUTO: 1.02 (ref 1–1.03)
T AXIS: 62 DEGREES
UROBILINOGEN UR STRIP.AUTO-MCNC: <2 MG/DL
VENTRICULAR RATE: 94 BPM
WBC # BLD AUTO: 6.1 X10(3) UL (ref 4–11)

## 2022-11-18 PROCEDURE — 81003 URINALYSIS AUTO W/O SCOPE: CPT | Performed by: EMERGENCY MEDICINE

## 2022-11-18 PROCEDURE — 85025 COMPLETE CBC W/AUTO DIFF WBC: CPT | Performed by: EMERGENCY MEDICINE

## 2022-11-18 PROCEDURE — 93005 ELECTROCARDIOGRAM TRACING: CPT

## 2022-11-18 PROCEDURE — 85025 COMPLETE CBC W/AUTO DIFF WBC: CPT

## 2022-11-18 PROCEDURE — 99284 EMERGENCY DEPT VISIT MOD MDM: CPT | Performed by: EMERGENCY MEDICINE

## 2022-11-18 PROCEDURE — 71045 X-RAY EXAM CHEST 1 VIEW: CPT

## 2022-11-18 PROCEDURE — 36415 COLL VENOUS BLD VENIPUNCTURE: CPT | Performed by: EMERGENCY MEDICINE

## 2022-11-18 PROCEDURE — 93010 ELECTROCARDIOGRAM REPORT: CPT | Performed by: EMERGENCY MEDICINE

## 2022-11-18 PROCEDURE — 80053 COMPREHEN METABOLIC PANEL: CPT | Performed by: EMERGENCY MEDICINE

## 2022-11-18 PROCEDURE — 81003 URINALYSIS AUTO W/O SCOPE: CPT

## 2022-11-18 PROCEDURE — 80053 COMPREHEN METABOLIC PANEL: CPT

## 2022-11-18 RX ORDER — ACETAMINOPHEN 500 MG
1000 TABLET ORAL ONCE
Status: COMPLETED | OUTPATIENT
Start: 2022-11-18 | End: 2022-11-18

## 2022-11-18 NOTE — DISCHARGE INSTRUCTIONS
Follow-up with your primary care doctor in the next 2 to 3 days for reassessment. Take the Paxlovid as prescribed. You must not take the Cialis or atorvastatin for at least 2 days after completion of the Paxlovid. Return for any shortness of breath, low oxygen level, lethargy, weakness, or any concerning symptoms.

## 2023-07-26 ENCOUNTER — OFFICE VISIT (OUTPATIENT)
Facility: LOCATION | Age: 83
End: 2023-07-26
Payer: COMMERCIAL

## 2023-07-26 DIAGNOSIS — R13.14 PHARYNGOESOPHAGEAL DYSPHAGIA: Primary | ICD-10-CM

## 2023-07-26 PROCEDURE — 1160F RVW MEDS BY RX/DR IN RCRD: CPT | Performed by: OTOLARYNGOLOGY

## 2023-07-26 PROCEDURE — 99203 OFFICE O/P NEW LOW 30 MIN: CPT | Performed by: OTOLARYNGOLOGY

## 2023-07-26 PROCEDURE — 31575 DIAGNOSTIC LARYNGOSCOPY: CPT | Performed by: OTOLARYNGOLOGY

## 2023-07-26 PROCEDURE — 1159F MED LIST DOCD IN RCRD: CPT | Performed by: OTOLARYNGOLOGY

## 2023-07-26 RX ORDER — FAMOTIDINE 20 MG/1
20 TABLET, FILM COATED ORAL NIGHTLY
Qty: 90 TABLET | Refills: 3 | Status: SHIPPED | OUTPATIENT
Start: 2023-07-26

## 2023-07-26 NOTE — PROGRESS NOTES
Doris Metz is a 81year old male. Patient presents with:  Throat Problem    HPI:   He has a history of swallowing. He had cervical spine surgery. The swelling seems of gotten worse. He has no pain. At times he will get some reflux and uses Tums. Current Outpatient Medications   Medication Sig Dispense Refill    famotidine (PEPCID) 20 MG Oral Tab Take 1 tablet (20 mg total) by mouth at bedtime. 90 tablet 3    Cholecalciferol (VITAMIN D) 50 MCG (2000 UT) Oral Tab Take 1 tablet by mouth daily. atorvastatin 10 MG Oral Tab Take 10 mg by mouth daily.           Past Medical History:   Diagnosis Date    Atherosclerosis 1/21/2011    Back problem     rt drop foot    BPH (benign prostatic hyperplasia)     Calculus of gallbladder with other cholecystitis, without mention of obstruction 2/7/2011    gallstones    Diverticulosis 2011    \"had a CAT scan which showed a few diverticulosis as well as thickening of gallbladder wall\" per chart 2/7/2011    Enlarged prostate     Foot drop, right foot     High cholesterol     Neuropathy     both hands; toes in right foot    Phlebolith 1/21/2011    pelvic phleboliths    Skin cancer     right temple    Visual impairment     glasses      Social History:  Social History     Socioeconomic History    Marital status: Single   Tobacco Use    Smoking status: Former     Packs/day: 1.00     Years: 20.00     Pack years: 20.00     Types: Cigarettes    Smokeless tobacco: Never    Tobacco comments:     quit in 1978   Vaping Use    Vaping Use: Never used   Substance and Sexual Activity    Alcohol use: Yes    Drug use: No      Past Surgical History:   Procedure Laterality Date    CERVICAL SPINE SURGERY  09/24/2020    Anterior Cervical fusion 4-6 discectomy    HERNIA SURGERY      left inguinal hernia    LAPAROSCOPIC CHOLECYSTECTOMY  2/7/2011    w/ intraoperative cholangiogram    OTHER SURGICAL HISTORY  12/15/2020    Cysto TRUS Dr. Debbi Hartman  01/13/2021    TURP Dr. Romain Samson REVIEW OF SYSTEMS:   GENERAL HEALTH: feels well otherwise  GENERAL : denies fever, chills, sweats, weight loss, weight gain  SKIN: denies any unusual skin lesions or rashes  RESPIRATORY: denies shortness of breath with exertion  NEURO: denies headaches    EXAM:   There were no vitals taken for this visit. System Findings Details   Constitutional  Overall appearance - Normal.   Psychiatric  Orientation - Oriented to time, place, person & situation. Appropriate mood and affect. Head/Face  Facial features -- Normal. Skull - Normal.   Eyes  Pupils equal ,round ,react to light and accomidate   Ears, Nose, Throat, Neck  Ears clear nose clear pharynx clear neck no masses   Neurological  Memory - Normal. Cranial nerves - Cranial nerves II through XII grossly intact. Lymph Detail  Submental. Submandibular. Anterior cervical. Posterior cervical. Supraclavicular. Flexible Laryngoscopy Procedure Note (38426)    Due to inability for adequate examination of the larynx and need for magnification to perform the examination, endoscopy was performed. Risks and benefits were discussed with patient/family and they have given verbal consent to proceed. Pre-operative Diagnosis: Pharyngoesophageal dysphagia  (primary encounter diagnosis)    Post-operative Diagnosis: Same    Procedure: Diagnostic flexible fiberoptic laryngoscopy    Anesthesia: topical lidocaine    Surgeon: Palmira Mao MD    EBL: 0cc    Procedure Detail & Findings: There is anterior protrusion of the cervical spine. Epiglottis and true vocal cords are normal.    Condition: Stable    Complications: Patient tolerated the procedure well with no immediate complications. Shonna Edwards MD    ASSESSMENT AND PLAN:   1. Pharyngoesophageal dysphagia  Is possible that the cervical spine surgery is playing a role. There is anterior protrusion of the cervical spine on flexible laryngoscopy. He does have some reflux.   I will place him on Pepcid every night for 1 month. If his swallowing is not significantly improved I then want him to see GI medicine. The patient indicates understanding of these issues and agrees to the plan. No follow-ups on file.     Faby Lainez MD  7/26/2023  9:48 AM

## 2023-08-22 ENCOUNTER — TELEPHONE (OUTPATIENT)
Dept: SURGERY | Facility: CLINIC | Age: 83
End: 2023-08-22

## 2023-08-22 NOTE — TELEPHONE ENCOUNTER
C4-5 and C5-6 anterior cervical discectomy and fusion for myelopathy and radiculopath - Sept 2020 w/ Dr. Mike Alcantara new symptoms relating to swallowing. Pt is not experiencing any issues with thin liquids. He is having problems swallowing small fragments, feeling like he may choke & needs to drink water to get it down. Has been having issues with this for about a year. Is not having issues with breathing. ENT 7.26.23:  \"1. Pharyngoesophageal dysphagia  Is possible that the cervical spine surgery is playing a role. There is anterior protrusion of the cervical spine on flexible laryngoscopy. He does have some reflux. I will place him on Pepcid every night for 1 month. If his swallowing is not significantly improved I then want him to see GI medicine\"    No recent imaging (last in '22). XR cervical spine 2.7.22:  \"CONCLUSION:    1. Postoperative changes from anterior cervical fusion at C4-C6. 2. Moderate to severe degenerative changes in the cervical spine. \"    LOV w/ Dr. Benitez Castro 2.7.22:  \"PLAN:  It seems that the pain is primarily muscular, but I will order x-rays of the cervical spine with flexion-extension views since his pain is exacerbated with extension. This will let us know if there is been a change in the location of the implants, or any new issues that would warrant further imaging. I will contact him via telephone with the results of these x-rays.      Patient states he does not need any pain medication prescribed for him today\"    Routed to DWIGHT pool for review & input

## 2023-08-22 NOTE — TELEPHONE ENCOUNTER
Pt called and offered to schedule appointment states will more than likely re establish care with our office but will hold off on scheduling appointment right now wcb to do so

## 2023-08-22 NOTE — TELEPHONE ENCOUNTER
If pt would like to follow up with Dr. Amanuel Sapp he is welcome to. Otherwise he would need to establish with a provider here.

## 2023-08-22 NOTE — TELEPHONE ENCOUNTER
PT calling to request an appt to see Dr. Jacque Sen pt was informed Dr. Jacque Sen is no longer with practice. Pt would like to be scheduled for an appt, states he is having difficulty swallowing and eating.

## 2023-10-13 ENCOUNTER — HOSPITAL ENCOUNTER (OUTPATIENT)
Dept: GENERAL RADIOLOGY | Age: 83
Discharge: HOME | End: 2023-10-13
Attending: INTERNAL MEDICINE
Payer: MEDICARE

## 2023-10-13 DIAGNOSIS — R05.3 CHRONIC COUGH: ICD-10-CM

## 2023-10-13 PROCEDURE — 71046 X-RAY EXAM CHEST 2 VIEWS: CPT | Performed by: INTERNAL MEDICINE

## 2024-02-27 ENCOUNTER — PATIENT MESSAGE (OUTPATIENT)
Facility: LOCATION | Age: 84
End: 2024-02-27

## 2024-02-27 NOTE — TELEPHONE ENCOUNTER
From: Aroldo Greco  To: Jose Lowe  Sent: 2/27/2024 12:05 PM CST  Subject: Question re throat exam    I am scheduled for surgery 4/16. The surgeon Dr BONI Damon at [facility] in [place] needs to know if any paralysis was observed when Dr France examined my throat. It will determine where he makes the incision on my throat.    Aroldo Greco  [phone]  [email or web address]

## 2024-07-18 ENCOUNTER — ORDER TRANSCRIPTION (OUTPATIENT)
Dept: PHYSICAL THERAPY | Facility: HOSPITAL | Age: 84
End: 2024-07-18

## 2024-07-18 DIAGNOSIS — Z98.1 S/P CERVICAL SPINAL FUSION: Primary | ICD-10-CM

## 2024-07-22 ENCOUNTER — TELEPHONE (OUTPATIENT)
Dept: PHYSICAL THERAPY | Facility: HOSPITAL | Age: 84
End: 2024-07-22

## 2024-07-24 ENCOUNTER — OFFICE VISIT (OUTPATIENT)
Dept: PHYSICAL THERAPY | Age: 84
End: 2024-07-24
Attending: STUDENT IN AN ORGANIZED HEALTH CARE EDUCATION/TRAINING PROGRAM
Payer: MEDICARE

## 2024-07-24 DIAGNOSIS — Z98.1 S/P CERVICAL SPINAL FUSION: Primary | ICD-10-CM

## 2024-07-24 PROCEDURE — 97161 PT EVAL LOW COMPLEX 20 MIN: CPT

## 2024-07-24 PROCEDURE — 97110 THERAPEUTIC EXERCISES: CPT

## 2024-07-24 NOTE — PROGRESS NOTES
SPINE EVALUATION:     Diagnosis:   S/P cervical spinal fusion (Z98.1)       Referring Provider: Marbella Damon  Date of Evaluation:    7/24/2024    Precautions:  History of CA Next MD visit:   Oct  Date of Surgery: 04/16/24     PATIENT SUMMARY   Aroldo Greco is a 83 year old male who presents to therapy today with complaints of neck stiffness. The pt underwent two surgeries simultaneously to improve dysphagia and spinal cord compression in the cervical spine on 4/16/24. Pt reports there is no pain since the operation. Pt reports numbness in ant and lat neck. Func limitations: looking over shoulder while driving, looking up and down while walking, lifting objects greater than 5 lbs. PLOF: no restriction w/ any of the mentioned activities.          Past medical history was reviewed with Aroldo. Significant findings include Aroldo is s/p C4-5 and C5-6 anterior cervical discectomy and fusion for myelopathy and radiculopathy on September 24, 2020.        ASSESSMENT  Aroldo presents to physical therapy evaluation with primary c/o Neck stiffness. The results of the objective tests and measures show Sub optimal sitting posture, reduced neck ROM, reduced joint motion and reduced strength in UE.  Functional deficits include but are not limited to looking over shoulder while driving, looking up and down while walking, lifting objects greater than 5 lbs.  Signs and symptoms are consistent with diagnosis of Post operative neck stiffness. Pt and PT discussed evaluation findings, pathology, POC and HEP.  Pt voiced understanding and performs HEP correctly without reported pain. Skilled Physical Therapy is medically necessary to address the above impairments and reach functional goals.     OBJECTIVE:   Observation/Posture: forward head, kyphotic  Neuro Screen: In tact    Cervical AROM: (* denotes performed with pain)  Flexion: 15  Extension: 15  Sidebending: R 15; L 5  Rotation: R 30; L 15    Accessory motion:   Central  PA: severe hypomobility   Unilateral PA R: Severe hypomobility   Unilateral PA L: Severe hypomobility   Palpation: TTP UT, LS, cervical paraspinals and suboccipital triangle     Strength: (* denotes performed with pain)  UE/Scapular   Shoulder Flex: R 4-/5, L 5/5  Shoulder ABD (C5): R 4-/5, L 5/5  Biceps (C6): R 5/5, L 5/5  Wrist ext (C6): R 5/5, L 5/5  Triceps (C7): R 5/5, L 5/5  Wrist Flex (C7): R 5/5, L 5/5  Digit Flex (C8): R 5/5, L 5/5  Thumb Ext (C8): R 5/5, L 5/5  Interossei (T1): R 5/5, L 5/5   strength: R: 46 lbs L: 63 lbs            Special tests:   Maximal cervical compression test: Neg  Spurling's test: Neg  Decompression test: Neg      Today’s Treatment and Response:   Pt education was provided on exam findings, treatment diagnosis, treatment plan, expectations, and prognosis. Pt was also provided recommendations for activity modifications and possible soreness after evaluation  Patient was instructed in and issued a HEP for: Neck mobility.    Charges: PT Eval Low Complexity,      Total Timed Treatment: 45 min     Total Treatment Time: 45 min     Based on clinical rationale and outcome measures, this evaluation involved Low Complexity decision making due to 1-2 personal factors/comorbidities, 1-2 body structures involved/activity limitations, and unstable symptoms including varying stiffness.   PLAN OF CARE:    Goals: (to be met in 12 visits)   Pt will improve cervical AROM flexion by 15 degrees to improve tolerance for looking down to tie shoes in 3 weeks.   Pt will improve cervical AROM extension by 15 degrees to improve tolerance for putting dishes into overhead cabinets in 3 weeks.    Pt will improve cervical AROM rotation to >35 degrees to improve tolerance for turning head to check blind spot while driving 6 weeks.   Pt will have improved sitting posture to WNL to look over shoulder while driving in 6 weeks.     Frequency / Duration: Patient will be seen for 2 x/week or a total of 12 visits  over a 6 week period. Treatment will include: Manual Therapy, Neuromuscular Re-education, Therapeutic Activities, and Therapeutic Exercise    Education or treatment limitation: None  Rehab Potential:good    Neck Disability Index Score  Score: 12 % (7/19/2024  9:44 AM)      Patient/Family/Caregiver was advised of these findings, precautions, and treatment options and has agreed to actively participate in planning and for this course of care.    Thank you for your referral. Please co-sign or sign and return this letter via fax as soon as possible to [phone]. If you have any questions, please contact me at Dept: [phone]    Sincerely,  Electronically signed by therapist: Hawk Shipman    Physician's certification required: Yes  I certify the need for these services furnished under this plan of treatment and while under my care.    X___________________________________________________ Date____________________    Certification From: 7/24/2024  To:10/22/2024

## 2024-07-24 NOTE — PATIENT INSTRUCTIONS
Access Code: [email or web address]  URL: [email or web address]  Date: 07/24/2024  Prepared by: Kevin Shipman    Exercises  - Supine Cervical Rotation AROM on Pillow  - 1 x daily - 7 x weekly - 3 sets - 10 reps  - Supine Cervical Sidebending  - 1 x daily - 7 x weekly - 3 sets - 10 reps  - Supine Chin Tuck  - 1 x daily - 7 x weekly - 3 sets - 10 reps

## 2024-07-30 ENCOUNTER — OFFICE VISIT (OUTPATIENT)
Dept: PHYSICAL THERAPY | Age: 84
End: 2024-07-30
Attending: STUDENT IN AN ORGANIZED HEALTH CARE EDUCATION/TRAINING PROGRAM
Payer: MEDICARE

## 2024-07-30 PROCEDURE — 97110 THERAPEUTIC EXERCISES: CPT

## 2024-07-30 PROCEDURE — 97140 MANUAL THERAPY 1/> REGIONS: CPT

## 2024-07-30 NOTE — PATIENT INSTRUCTIONS
Access Code: [email or web address]  URL: [email or web address]  Date: 07/24/2024  Prepared by: Kevin Shipman    Exercises  - Supine Cervical Rotation AROM on Pillow  - 1 x daily - 7 x weekly - 3 sets - 10 reps  - Supine Cervical Sidebending  - 1 x daily - 7 x weekly - 3 sets - 10 reps  - Supine Chin Tuck  - 1 x daily - 7 x weekly - 3 sets - 10 reps

## 2024-07-30 NOTE — PROGRESS NOTES
Diagnosis:   S/P cervical spinal fusion (Z98.1)          Referring Provider: Marbella Damon  Date of Evaluation:    07/24/2024    Precautions:  History of CA  Next MD visit:   10/24  Date of Surgery: 04/16/24    Insurance Primary/Secondary: [facility] MEDICARE / N/A     # Auth Visits: 12            Subjective: Pt reports mod aching in neck after first visit, improvement second day, currently back to baseline.     Pain:  1/10      Objective:     Assessment: Severe trigger points and hypomobility noted in cervical paraspinals, UT and thoracic vertebrae.  AROM continues to be severely limited. Pt's surgical sites are displaying appropriate healing.          Goals:   Pt will improve cervical AROM flexion by 15 degrees to improve tolerance for looking down to tie shoes in 3 weeks.   Pt will improve cervical AROM extension by 15 degrees to improve tolerance for putting dishes into overhead cabinets in 3 weeks.    Pt will improve cervical AROM rotation to >35 degrees to improve tolerance for turning head to check blind spot while driving 6 weeks.   Pt will have improved sitting posture to WNL to look over shoulder while driving in 6 weeks.     Plan: Consider adding pulleys next visit.   Date: 7/30/2024  TX#: 2/12 Date:                 TX#: 3/ Date:                 TX#: 4/ Date:                 TX#: 5/ Date:   Tx#: 6/   UBE-8 min       STM suboccipital triangle and cervical musculature-8  Thoracic PA-8       Supine AROM SB and rotation  Sitting AROM flex/ ext, retraction/pro, LS                 HEP:   Access Code: [email or web address]  URL: [email or web address]  Date: 07/24/2024  Prepared by: Kevin Shipman    Exercises  - Supine Cervical Rotation AROM on Pillow  - 1 x daily - 7 x weekly - 3 sets - 10 reps  - Supine Cervical Sidebending  - 1 x daily - 7 x weekly - 3 sets - 10 reps  - Supine Chin Tuck  - 1 x daily - 7 x weekly - 3 sets - 10 reps    Charges: M, 2-TE       Total Timed Treatment: M-16, TE-30  min  Total Treatment Time: 46 min

## 2024-07-31 NOTE — PROGRESS NOTES
Diagnosis:   S/P cervical spinal fusion (Z98.1)          Referring Provider: Marbella Damon  Date of Evaluation:    07/24/2024    Precautions:  History of CA  Next MD visit:   10/24  Date of Surgery: 04/16/24    Insurance Primary/Secondary: [facility] MEDICARE / N/A     # Auth Visits: 12            Subjective: Pt reports neck stiffness has remained the same since last visit, and some back stiffness from thoracic Pas.     Pain:    5-6/10      Objective:     Assessment: Pt's interscapular muscles are beginning to relax, but UT remains sig tight and tender. Pt's greatest ROM deficit is SB, very minimal. Pt displayed cerv compensations during AAROM, made subtle improvements w/ tactile cueing.         Goals:   Pt will improve cervical AROM flexion by 15 degrees to improve tolerance for looking down to tie shoes in 3 weeks.   Pt will improve cervical AROM extension by 15 degrees to improve tolerance for putting dishes into overhead cabinets in 3 weeks.    Pt will improve cervical AROM rotation to >35 degrees to improve tolerance for turning head to check blind spot while driving 6 weeks.   Pt will have improved sitting posture to WNL to look over shoulder while driving in 6 weeks.     Plan: Assess response to AAROM.   Date: 7/30/2024  TX#: 2/12 Date:                 TX#: 3/ Date:                 TX#: 4/ Date:                 TX#: 5/ Date:   Tx#: 6/   UBE-8 min Pulleys: flex, abd, scap-6 min       STM suboccipital triangle and cervical musculature-8  Thoracic PA-8 STM suboccipital triangle and cervical musculature-7  Thoracic PA-7      Supine AROM SB and rotation  Sitting AROM flex/ ext, retraction/pro, LS    Supine AROM SB and rotation  Sitting AROM flex/ ext  SHLDR AAROM flex and ext x 15 ea   PT assisted snag x 3, 5 sec hold               HEP:   Access Code: [email or web address]  URL: [email or web address]  Date: 07/24/2024  Prepared by: Kevin Shipman    Exercises  - Supine Cervical Rotation AROM on Pillow   - 1 x daily - 7 x weekly - 3 sets - 10 reps  - Supine Cervical Sidebending  - 1 x daily - 7 x weekly - 3 sets - 10 reps  - Supine Chin Tuck  - 1 x daily - 7 x weekly - 3 sets - 10 reps    Charges: M, 2-TE       Total Timed Treatment: M-14, TE-30 min  Total Treatment Time: 44 min

## 2024-08-01 ENCOUNTER — OFFICE VISIT (OUTPATIENT)
Dept: PHYSICAL THERAPY | Age: 84
End: 2024-08-01
Attending: STUDENT IN AN ORGANIZED HEALTH CARE EDUCATION/TRAINING PROGRAM
Payer: MEDICARE

## 2024-08-01 PROCEDURE — 97140 MANUAL THERAPY 1/> REGIONS: CPT

## 2024-08-01 PROCEDURE — 97110 THERAPEUTIC EXERCISES: CPT

## 2024-08-06 ENCOUNTER — OFFICE VISIT (OUTPATIENT)
Dept: PHYSICAL THERAPY | Age: 84
End: 2024-08-06
Attending: STUDENT IN AN ORGANIZED HEALTH CARE EDUCATION/TRAINING PROGRAM
Payer: MEDICARE

## 2024-08-06 PROCEDURE — 97140 MANUAL THERAPY 1/> REGIONS: CPT

## 2024-08-06 PROCEDURE — 97110 THERAPEUTIC EXERCISES: CPT

## 2024-08-06 NOTE — PATIENT INSTRUCTIONS
Access Code: [email or web address]  URL: [email or web address]  Date: 07/24/2024  Prepared by: Kevin Shipman    Exercises  - Supine Cervical Rotation AROM on Pillow  - 1 x daily - 7 x weekly - 3 sets - 10 reps  - Supine Cervical Sidebending  - 1 x daily - 7 x weekly - 3 sets - 10 reps  - Supine Chin Tuck  - 1 x daily - 7 x weekly - 3 sets - 10 reps

## 2024-08-06 NOTE — PROGRESS NOTES
Diagnosis:   S/P cervical spinal fusion (Z98.1)          Referring Provider: Marbella Damon  Date of Evaluation:    07/24/2024    Precautions:  History of CA  Next MD visit:   10/24  Date of Surgery: 04/16/24    Insurance Primary/Secondary: [employer] MEDICARE / N/A     # Auth Visits: 12            Subjective: Pt reports neck stiffness has improved but still remains, no pain.     Pain:   0/10      Objective: Pt able to rotate neck much farther than last visit, before any interventions.     Assessment: Following heat and mobilizations, the pt was able to rotate and ext sig farther than baseline. Pt responded well to cervical SNAG rotation, cued to add light tension to avoid over straining surgical sight.       Goals:   Pt will improve cervical AROM flexion by 15 degrees to improve tolerance for looking down to tie shoes in 3 weeks.   Pt will improve cervical AROM extension by 15 degrees to improve tolerance for putting dishes into overhead cabinets in 3 weeks.    Pt will improve cervical AROM rotation to >35 degrees to improve tolerance for turning head to check blind spot while driving 6 weeks.   Pt will have improved sitting posture to WNL to look over shoulder while driving in 6 weeks.     Plan: Consider adding cerv stretching EX.   Date: 7/30/2024  TX#: 2/12 Date:                 TX#: 3/ Date:8/6/2024                 TX#: 4/ Date:                 TX#: 5/ Date:   Tx#: 6/   UBE-8 min Pulleys: flex, abd, scap-6 min  Pulleys: flex, abd, scap-6 min      STM suboccipital triangle and cervical musculature-8  Thoracic PA-8 STM suboccipital triangle and cervical musculature-7  Thoracic PA-7 STM suboccipital triangle and cervical musculature-8  Thoracic PA-8     Supine AROM SB and rotation  Sitting AROM flex/ ext, retraction/pro, LS    Supine AROM SB and rotation  Sitting AROM flex/ ext  SHLDR AAROM flex and ext x 15 ea   PT assisted snag x 3, 5 sec hold   Sitting AROM SB and rotation  Sitting AROM flex/ ext  SHLDR  AAROM flex and ext x 15 ea   Cerv rotation snag x 3, 5 sec hold       Prone hot pack cervical-5 min      HEP:   Access Code: [email or web address]  URL: [email or web address]  Date: 07/24/2024  Prepared by: Kevin Shipman    Exercises  - Supine Cervical Rotation AROM on Pillow  - 1 x daily - 7 x weekly - 3 sets - 10 reps  - Supine Cervical Sidebending  - 1 x daily - 7 x weekly - 3 sets - 10 reps  - Supine Chin Tuck  - 1 x daily - 7 x weekly - 3 sets - 10 reps    Charges: M, 2-TE       Total Timed Treatment: M-16, TE-28 min  Total Treatment Time: 44 min

## 2024-08-08 ENCOUNTER — OFFICE VISIT (OUTPATIENT)
Dept: PHYSICAL THERAPY | Age: 84
End: 2024-08-08
Attending: STUDENT IN AN ORGANIZED HEALTH CARE EDUCATION/TRAINING PROGRAM
Payer: MEDICARE

## 2024-08-08 PROCEDURE — 97110 THERAPEUTIC EXERCISES: CPT

## 2024-08-08 PROCEDURE — 97140 MANUAL THERAPY 1/> REGIONS: CPT

## 2024-08-08 NOTE — PROGRESS NOTES
Diagnosis:   S/P cervical spinal fusion (Z98.1)          Referring Provider: Marbella Damon  Date of Evaluation:    07/24/2024    Precautions:  History of CA  Next MD visit:   10/24  Date of Surgery: 04/16/24    Insurance Primary/Secondary: [employer] MEDICARE / N/A     # Auth Visits: 12            Subjective: Pt reports mild neck pain when looking down, intermittent in nature.  Neck is not painful at the moment.     Pain:   0/10      Objective: Pt able to look up/down further.     Assessment: Pt continues to respond positively to pulleys w/ improved neck mobility. Put displaying very little lat flex of neck, but after UT stretch there was a noticeable improvements. Surgical scar displaying appropriate healing.         Goals:   Pt will improve cervical AROM flexion by 15 degrees to improve tolerance for looking down to tie shoes in 3 weeks.   Pt will improve cervical AROM extension by 15 degrees to improve tolerance for putting dishes into overhead cabinets in 3 weeks.    Pt will improve cervical AROM rotation to >35 degrees to improve tolerance for turning head to check blind spot while driving 6 weeks.   Pt will have improved sitting posture to WNL to look over shoulder while driving in 6 weeks.     Plan: Add thoracic PA.   Date: 7/30/2024  TX#: 2/12 Date:                 TX#: 3/ Date:8/6/2024                 TX#: 4/ Date: 8/8/2024                TX#: 5/12 Date:   Tx#: 6/   UBE-8 min Pulleys: flex, abd, scap-6 min  Pulleys: flex, abd, scap-6 min  Pulleys: flex, abd, scap-6 min     STM suboccipital triangle and cervical musculature-8  Thoracic PA-8 STM suboccipital triangle and cervical musculature-7  Thoracic PA-7 STM suboccipital triangle and cervical musculature-8  Thoracic PA-8 STM suboccipital triangle and cervical musculature-14      Supine AROM SB and rotation  Sitting AROM flex/ ext, retraction/pro, LS    Supine AROM SB and rotation  Sitting AROM flex/ ext  SHLDR AAROM flex and ext x 15 ea   PT  assisted snag x 3, 5 sec hold   Sitting AROM SB and rotation  Sitting AROM flex/ ext  SHLDR AAROM flex and ext x 15 ea   Cerv rotation snag x 3, 5 sec hold Func IR and ER stretch 2 x 60 sec   UT stretch 2 x 30 sec        Prone hot pack cervical-5 min  Prone hot pack cervical-5 min     HEP:   Access Code: [email or web address]  URL: [email or web address]  Date: 07/24/2024  Prepared by: Kevin Shipman    Exercises  - Supine Cervical Rotation AROM on Pillow  - 1 x daily - 7 x weekly - 3 sets - 10 reps  - Supine Cervical Sidebending  - 1 x daily - 7 x weekly - 3 sets - 10 reps  - Supine Chin Tuck  - 1 x daily - 7 x weekly - 3 sets - 10 reps    Charges: M, 2-TE       Total Timed Treatment: M-14, TE-30 min  Total Treatment Time: 44 min

## 2024-08-13 ENCOUNTER — OFFICE VISIT (OUTPATIENT)
Dept: PHYSICAL THERAPY | Age: 84
End: 2024-08-13
Attending: STUDENT IN AN ORGANIZED HEALTH CARE EDUCATION/TRAINING PROGRAM
Payer: MEDICARE

## 2024-08-13 PROCEDURE — 97110 THERAPEUTIC EXERCISES: CPT

## 2024-08-13 PROCEDURE — 97140 MANUAL THERAPY 1/> REGIONS: CPT

## 2024-08-13 NOTE — PROGRESS NOTES
Diagnosis:   S/P cervical spinal fusion (Z98.1)          Referring Provider: Marbella Damon  Date of Evaluation:    07/24/2024    Precautions:  History of CA  Next MD visit:   10/24  Date of Surgery: 04/16/24    Insurance Primary/Secondary: [facility] MEDICARE / N/A     # Auth Visits: 12            Subjective: Pt reports neck pain has resolved, just stiffness, R > L. Pt also reported ant neck tension.     Pain:   0/10      Objective:   Flex: 28, 30 following stretches   EXT: 30, 35 following stretches   Pt able to rotate L greater than R.   Assessment: Pt appears to be responding positively to cerv stretching in the form of improved AROM. Pt's neck stiffness gradually improving. Pt's movement tolerance continues to improve, the addition of isometric strength EX should be handled well next visit.          Goals:   Pt will improve cervical AROM flexion by 15 degrees to improve tolerance for looking down to tie shoes in 3 weeks.   Pt will improve cervical AROM extension by 15 degrees to improve tolerance for putting dishes into overhead cabinets in 3 weeks.    Pt will improve cervical AROM rotation to >35 degrees to improve tolerance for turning head to check blind spot while driving 6 weeks.   Pt will have improved sitting posture to WNL to look over shoulder while driving in 6 weeks.     Plan: Add seated cerv retractions into fit ball and remove pulleys next visit.   Date: 7/30/2024  TX#: 2/12 Date:                 TX#: 3/ Date:8/6/2024                 TX#: 4/ Date: 8/8/2024                TX#: 5/12 Date: 8/13/24  Tx#: 6/12   UBE-8 min Pulleys: flex, abd, scap-6 min  Pulleys: flex, abd, scap-6 min  Pulleys: flex, abd, scap-6 min  Pulleys: flex, abd, scap-6 min    STM suboccipital triangle and cervical musculature-8  Thoracic PA-8 STM suboccipital triangle and cervical musculature-7  Thoracic PA-7 STM suboccipital triangle and cervical musculature-8  Thoracic PA-8 STM suboccipital triangle and cervical  musculature-14   STM suboccipital triangle and cervical musculature-10   Supine AROM SB and rotation  Sitting AROM flex/ ext, retraction/pro, LS    Supine AROM SB and rotation  Sitting AROM flex/ ext  SHLDR AAROM flex and ext x 15 ea   PT assisted snag x 3, 5 sec hold   Sitting AROM SB and rotation  Sitting AROM flex/ ext  SHLDR AAROM flex and ext x 15 ea   Cerv rotation snag x 3, 5 sec hold Func IR and ER stretch 2 x 60 sec   UT stretch 2 x 30 sec   Seated UE Asst flex and ext stretches 2 x 30 sec ea   UT stretch 2 x 30 sec  LS stretch 2 x 30 sec   Scalenes stretch 2 x 30 ea   Shoulder rolls fwd/bkwd x 20 ea      Prone hot pack cervical-5 min  Prone hot pack cervical-5 min  Prone hot pack cervical-5 min    HEP:   Access Code: [email or web address]  URL: [email or web address]  Date: 07/24/2024  Prepared by: Kevin Shipman    Exercises  - Supine Cervical Rotation AROM on Pillow  - 1 x daily - 7 x weekly - 3 sets - 10 reps  - Supine Cervical Sidebending  - 1 x daily - 7 x weekly - 3 sets - 10 reps  - Supine Chin Tuck  - 1 x daily - 7 x weekly - 3 sets - 10 reps    Charges: M, 3-TE       Total Timed Treatment: M-10, TE-38 min  Total Treatment Time: 48 min

## 2024-08-15 ENCOUNTER — OFFICE VISIT (OUTPATIENT)
Dept: PHYSICAL THERAPY | Age: 84
End: 2024-08-15
Attending: STUDENT IN AN ORGANIZED HEALTH CARE EDUCATION/TRAINING PROGRAM
Payer: MEDICARE

## 2024-08-15 PROCEDURE — 97140 MANUAL THERAPY 1/> REGIONS: CPT

## 2024-08-15 PROCEDURE — 97110 THERAPEUTIC EXERCISES: CPT

## 2024-08-15 NOTE — PROGRESS NOTES
Diagnosis:   S/P cervical spinal fusion (Z98.1)          Referring Provider: Marbella Damon  Date of Evaluation:    07/24/2024    Precautions:  History of CA  Next MD visit:   10/24  Date of Surgery: 04/16/24    Insurance Primary/Secondary: [facility] MEDICARE / N/A     # Auth Visits: 12            Subjective: Pt reports muscle aching in R shoulder, but neck itself is feeling fine.      Pain:   0/10      Objective:   Improved cerv rotation following STM.   Flex: 40  EXT: 30    Assessment:  Pt responded positively to STM, likely the upper back musculature was over worked yesterday. Pt unable to reach end range during seated ball roll outs implying tension in spinal erectors, that is likely contributing to neck tension. Pt's neck flex ROM improved nearly 10 degrees from last visit.            Goals:   Pt will improve cervical AROM flexion by 15 degrees to improve tolerance for looking down to tie shoes in 3 weeks.   Pt will improve cervical AROM extension by 15 degrees to improve tolerance for putting dishes into overhead cabinets in 3 weeks.    Pt will improve cervical AROM rotation to >35 degrees to improve tolerance for turning head to check blind spot while driving 6 weeks.   Pt will have improved sitting posture to WNL to look over shoulder while driving in 6 weeks.     Plan: Consider adding shldr horiz abd stretch.   Date: 7/30/2024  TX#: 2/12 Date:                 TX#: 3/ Date:8/6/2024                 TX#: 4/ Date: 8/8/2024                TX#: 5/12 Date: 8/13/24  Tx#: 6/12 Date: 8/15/24  TX#: 7/12   UBE-8 min Pulleys: flex, abd, scap-6 min  Pulleys: flex, abd, scap-6 min  Pulleys: flex, abd, scap-6 min  Pulleys: flex, abd, scap-6 min     STM suboccipital triangle and cervical musculature-8  Thoracic PA-8 STM suboccipital triangle and cervical musculature-7  Thoracic PA-7 STM suboccipital triangle and cervical musculature-8  Thoracic PA-8 STM suboccipital triangle and cervical musculature-14   STM  suboccipital triangle and cervical musculature-10 STM suboccipital triangle and cervical musculature-14   Supine AROM SB and rotation  Sitting AROM flex/ ext, retraction/pro, LS    Supine AROM SB and rotation  Sitting AROM flex/ ext  SHLDR AAROM flex and ext x 15 ea   PT assisted snag x 3, 5 sec hold   Sitting AROM SB and rotation  Sitting AROM flex/ ext  SHLDR AAROM flex and ext x 15 ea   Cerv rotation snag x 3, 5 sec hold Func IR and ER stretch 2 x 60 sec   UT stretch 2 x 30 sec   Seated UE Asst flex and ext stretches 2 x 30 sec ea   UT stretch 2 x 30 sec  LS stretch 2 x 30 sec   Scalenes stretch 2 x 30 ea   Shoulder rolls fwd/bkwd x 20 ea  Seated UE Asst flex, ext and rotation stretches 2 x 60 sec ea   Sitting ball rollouts for spinal erector stretching x 15      Prone hot pack cervical-5 min  Prone hot pack cervical-5 min  Prone hot pack cervical-5 min  Prone hot pack cerv-5 min    HEP:   Access Code: [email or web address]  URL: [email or web address]  Date: 07/24/2024  Prepared by: Kevin Shipman    Exercises  - Supine Cervical Rotation AROM on Pillow  - 1 x daily - 7 x weekly - 3 sets - 10 reps  - Supine Cervical Sidebending  - 1 x daily - 7 x weekly - 3 sets - 10 reps  - Supine Chin Tuck  - 1 x daily - 7 x weekly - 3 sets - 10 reps    Charges: M, 2-TE       Total Timed Treatment: M-14, TE-25, Heat-5 min  Total Treatment Time: 44 min

## 2024-08-20 ENCOUNTER — OFFICE VISIT (OUTPATIENT)
Dept: PHYSICAL THERAPY | Age: 84
End: 2024-08-20
Attending: STUDENT IN AN ORGANIZED HEALTH CARE EDUCATION/TRAINING PROGRAM
Payer: MEDICARE

## 2024-08-20 PROCEDURE — 97110 THERAPEUTIC EXERCISES: CPT

## 2024-08-20 PROCEDURE — 97140 MANUAL THERAPY 1/> REGIONS: CPT

## 2024-08-20 NOTE — PROGRESS NOTES
Diagnosis:   S/P cervical spinal fusion (Z98.1)          Referring Provider: Marbella Damon  Date of Evaluation:    07/24/2024    Precautions:  History of CA  Next MD visit:   10/24  Date of Surgery: 04/16/24    Insurance Primary/Secondary: [facility] MEDICARE / N/A     # Auth Visits: 12            Subjective: Pt reports L side of neck and shoulder are loosening up, but R side remains tight.       Pain:   0/10      Objective: Flex and Ext: 30 degrees. Rotations: R 34; L 36      Assessment: Pt displayed restrictions during wall slides and pec stretch implying tight back and chest musculature, that may be impacting cerv AROM. Pt's greatest movement restriction continues to be Sbing, likely due to sig trigger point in R UT.        Goals:   Pt will improve cervical AROM flexion by 15 degrees to improve tolerance for looking down to tie shoes in 3 weeks.   Pt will improve cervical AROM extension by 15 degrees to improve tolerance for putting dishes into overhead cabinets in 3 weeks.    Pt will improve cervical AROM rotation to >35 degrees to improve tolerance for turning head to check blind spot while driving 6 weeks.   Pt will have improved sitting posture to WNL to look over shoulder while driving in 6 weeks.     Plan: Add Sbing strength.   Date: 7/30/2024  TX#: 2/12 Date:                 TX#: 3/ Date:8/6/2024                 TX#: 4/ Date: 8/8/2024                TX#: 5/12 Date: 8/13/24  Tx#: 6/12 Date: 8/15/24  TX#: 7/12 Date: 8/20/24  TX#: 8/12   UBE-8 min Pulleys: flex, abd, scap-6 min  Pulleys: flex, abd, scap-6 min  Pulleys: flex, abd, scap-6 min  Pulleys: flex, abd, scap-6 min      STM suboccipital triangle and cervical musculature-8  Thoracic PA-8 STM suboccipital triangle and cervical musculature-7  Thoracic PA-7 STM suboccipital triangle and cervical musculature-8  Thoracic PA-8 STM suboccipital triangle and cervical musculature-14   STM suboccipital triangle and cervical musculature-10 STM suboccipital  triangle and cervical musculature-14 STM suboccipital triangle and cervical musculature-12   Supine AROM SB and rotation  Sitting AROM flex/ ext, retraction/pro, LS    Supine AROM SB and rotation  Sitting AROM flex/ ext  SHLDR AAROM flex and ext x 15 ea   PT assisted snag x 3, 5 sec hold   Sitting AROM SB and rotation  Sitting AROM flex/ ext  SHLDR AAROM flex and ext x 15 ea   Cerv rotation snag x 3, 5 sec hold Func IR and ER stretch 2 x 60 sec   UT stretch 2 x 30 sec   Seated UE Asst flex and ext stretches 2 x 30 sec ea   UT stretch 2 x 30 sec  LS stretch 2 x 30 sec   Scalenes stretch 2 x 30 ea   Shoulder rolls fwd/bkwd x 20 ea  Seated UE Asst flex, ext and rotation stretches 2 x 60 sec ea   Sitting ball rollouts for spinal erector stretching x 15  Horiz abd stretch 2 x 60 sec B  UT stretch x 90 sec ea   Towel slides in flex on wall x 20   Pt overpressure rotation, Sbing and flex/ext x 10 ea   Seated fit ball retractions x 30   Supine head lifts for deep neck flexors  Standing 90/90 pec stretch 2 x 60 sec      Prone hot pack cervical-5 min  Prone hot pack cervical-5 min  Prone hot pack cervical-5 min  Prone hot pack cerv-5 min  Prone hot pack cerv-5 min    HEP:   Access Code: [email or web address]  URL: [email or web address]  Date: 07/24/2024  Prepared by: Kevin Shipman    Exercises  - Supine Cervical Rotation AROM on Pillow  - 1 x daily - 7 x weekly - 3 sets - 10 reps  - Supine Cervical Sidebending  - 1 x daily - 7 x weekly - 3 sets - 10 reps  - Supine Chin Tuck  - 1 x daily - 7 x weekly - 3 sets - 10 reps    Charges: M, 2-TE       Total Timed Treatment: M-12, TE-27, Heat-5 min  Total Treatment Time: 44 min

## 2024-08-21 ENCOUNTER — TELEPHONE (OUTPATIENT)
Dept: PHYSICAL THERAPY | Age: 84
End: 2024-08-21

## 2024-08-21 ENCOUNTER — OFFICE VISIT (OUTPATIENT)
Dept: PHYSICAL THERAPY | Age: 84
End: 2024-08-21
Attending: STUDENT IN AN ORGANIZED HEALTH CARE EDUCATION/TRAINING PROGRAM
Payer: MEDICARE

## 2024-08-21 PROCEDURE — 97110 THERAPEUTIC EXERCISES: CPT

## 2024-08-21 PROCEDURE — 97140 MANUAL THERAPY 1/> REGIONS: CPT

## 2024-08-21 NOTE — PROGRESS NOTES
Diagnosis:   S/P cervical spinal fusion (Z98.1)          Referring Provider: Marbella Damon  Date of Evaluation:    07/24/2024    Precautions:  History of CA  Next MD visit:   10/24  Date of Surgery: 04/16/24    Insurance Primary/Secondary: [facility] MEDICARE / N/A     # Auth Visits: 12            Subjective: Pt reports since last session felt his right side of his neck start to loosen up. Admits has not been very compliant with his home program.     Pain:   0/10      Objective:   Not measured this session: Flex and Ext: 30 degrees. Rotations: R 34; L 36      Assessment: Discussed precautions/contraindications of DN with pt. Discussed expectations during and post-tx. Noted moderate muscle twitch on L. Greater restrictions to R UT; tx performed in supine for easier access. Reviewed HEP and encouraged compliance.       Goals:   Pt will improve cervical AROM flexion by 15 degrees to improve tolerance for looking down to tie shoes in 3 weeks.   Pt will improve cervical AROM extension by 15 degrees to improve tolerance for putting dishes into overhead cabinets in 3 weeks.    Pt will improve cervical AROM rotation to >35 degrees to improve tolerance for turning head to check blind spot while driving 6 weeks.   Pt will have improved sitting posture to WNL to look over shoulder while driving in 6 weeks.     Plan: discuss effect of DN. Add Sbing strength.   Date: 7/30/2024  TX#: 2/12 Date:                 TX#: 3/ Date:8/6/2024                 TX#: 4/ Date: 8/8/2024                TX#: 5/12 Date: 8/13/24  Tx#: 6/12 Date: 8/15/24  TX#: 7/12 Date: 8/20/24  TX#: 8/12 Date: 8/21/24  Tx#: 9/12   UBE-8 min Pulleys: flex, abd, scap-6 min  Pulleys: flex, abd, scap-6 min  Pulleys: flex, abd, scap-6 min  Pulleys: flex, abd, scap-6 min       STM suboccipital triangle and cervical musculature-8  Thoracic PA-8 STM suboccipital triangle and cervical musculature-7  Thoracic PA-7 STM suboccipital triangle and cervical  musculature-8  Thoracic PA-8 STM suboccipital triangle and cervical musculature-14   STM suboccipital triangle and cervical musculature-10 STM suboccipital triangle and cervical musculature-14 STM suboccipital triangle and cervical musculature-12 Assessed taut bands and identified TPS in UTs B; DN with 30mm needle to L UT in prone, RUT in supine without adverse reaction  Seated 1st and 2nd rib mobs   Supine AROM SB and rotation  Sitting AROM flex/ ext, retraction/pro, LS    Supine AROM SB and rotation  Sitting AROM flex/ ext  SHLDR AAROM flex and ext x 15 ea   PT assisted snag x 3, 5 sec hold   Sitting AROM SB and rotation  Sitting AROM flex/ ext  SHLDR AAROM flex and ext x 15 ea   Cerv rotation snag x 3, 5 sec hold Func IR and ER stretch 2 x 60 sec   UT stretch 2 x 30 sec   Seated UE Asst flex and ext stretches 2 x 30 sec ea   UT stretch 2 x 30 sec  LS stretch 2 x 30 sec   Scalenes stretch 2 x 30 ea   Shoulder rolls fwd/bkwd x 20 ea  Seated UE Asst flex, ext and rotation stretches 2 x 60 sec ea   Sitting ball rollouts for spinal erector stretching x 15  Horiz abd stretch 2 x 60 sec B  UT stretch x 90 sec ea   Towel slides in flex on wall x 20   Pt overpressure rotation, Sbing and flex/ext x 10 ea   Seated fit ball retractions x 30   Supine head lifts for deep neck flexors  Standing 90/90 pec stretch 2 x 60 sec  Seated c/s AROM all directions  Standing towel slides in flex on wall 20x  Seated cervical extension over towel 10x  Seated cervical rotation with Pt overpressure 5x R/L     Prone hot pack cervical-5 min  Prone hot pack cervical-5 min  Prone hot pack cervical-5 min  Prone hot pack cerv-5 min  Prone hot pack cerv-5 min  Seated: cervical HP x5mins   HEP:   Access Code: [email or web address]  URL: [email or web address]  Date: 07/24/2024  Prepared by: Kevin Shipman    Exercises  - Supine Cervical Rotation AROM on Pillow  - 1 x daily - 7 x weekly - 3 sets - 10 reps  - Supine Cervical Sidebending  - 1 x daily  - 7 x weekly - 3 sets - 10 reps  - Supine Chin Tuck  - 1 x daily - 7 x weekly - 3 sets - 10 reps    Charges: M-2, TE-1       Total Timed Treatment: M-25, TE-17, Heat-5 min  Total Treatment Time: 47 min

## 2024-08-22 ENCOUNTER — OFFICE VISIT (OUTPATIENT)
Dept: PHYSICAL THERAPY | Age: 84
End: 2024-08-22
Attending: STUDENT IN AN ORGANIZED HEALTH CARE EDUCATION/TRAINING PROGRAM
Payer: MEDICARE

## 2024-08-22 PROCEDURE — 97110 THERAPEUTIC EXERCISES: CPT

## 2024-08-22 PROCEDURE — 97140 MANUAL THERAPY 1/> REGIONS: CPT

## 2024-08-22 NOTE — PROGRESS NOTES
Diagnosis:   S/P cervical spinal fusion (Z98.1)          Referring Provider: Marbella Damon  Date of Evaluation:    07/24/2024    Precautions:  History of CA  Next MD visit:   10/24  Date of Surgery: 04/16/24    Insurance Primary/Secondary: [facility] MEDICARE / N/A     # Auth Visits: 12            Subjective: Pt reports improved stiffness in upper back, but mod stiffness still remains.     Pain:   0/10      Objective:   Ext: 35  SB: R 8, L 10       Assessment: Pt improved Sbing and ext ROM. Significant triggers point in B UT continue to be present.      Goals:   Pt will improve cervical AROM flexion by 15 degrees to improve tolerance for looking down to tie shoes in 3 weeks.   Pt will improve cervical AROM extension by 15 degrees to improve tolerance for putting dishes into overhead cabinets in 3 weeks.    Pt will improve cervical AROM rotation to >35 degrees to improve tolerance for turning head to check blind spot while driving 6 weeks.   Pt will have improved sitting posture to WNL to look over shoulder while driving in 6 weeks.     Plan: Re-eval.    Date: 7/30/2024  TX#: 2/12 Date:                 TX#: 3/ Date:8/6/2024                 TX#: 4/ Date: 8/8/2024                TX#: 5/12 Date: 8/13/24  Tx#: 6/12 Date: 8/15/24  TX#: 7/12 Date: 8/20/24  TX#: 8/12 Date: 8/21/24  Tx#: 9/12 Date: 8/22/24  TX#: 10/12   UBE-8 min Pulleys: flex, abd, scap-6 min  Pulleys: flex, abd, scap-6 min  Pulleys: flex, abd, scap-6 min  Pulleys: flex, abd, scap-6 min        STM suboccipital triangle and cervical musculature-8  Thoracic PA-8 STM suboccipital triangle and cervical musculature-7  Thoracic PA-7 STM suboccipital triangle and cervical musculature-8  Thoracic PA-8 STM suboccipital triangle and cervical musculature-14   STM suboccipital triangle and cervical musculature-10 STM suboccipital triangle and cervical musculature-14 STM suboccipital triangle and cervical musculature-12 Assessed taut bands and identified TPS in  UTs B; DN with 30mm needle to L UT in prone, RUT in supine without adverse reaction  Seated 1st and 2nd rib mobs Self myofascial release of upper back musculature- 6 min   STM neck and upper back musculature-9 min    SNAG cerv ext 10 x 5 sec hold    Supine AROM SB and rotation  Sitting AROM flex/ ext, retraction/pro, LS    Supine AROM SB and rotation  Sitting AROM flex/ ext  SHLDR AAROM flex and ext x 15 ea   PT assisted snag x 3, 5 sec hold   Sitting AROM SB and rotation  Sitting AROM flex/ ext  SHLDR AAROM flex and ext x 15 ea   Cerv rotation snag x 3, 5 sec hold Func IR and ER stretch 2 x 60 sec   UT stretch 2 x 30 sec   Seated UE Asst flex and ext stretches 2 x 30 sec ea   UT stretch 2 x 30 sec  LS stretch 2 x 30 sec   Scalenes stretch 2 x 30 ea   Shoulder rolls fwd/bkwd x 20 ea  Seated UE Asst flex, ext and rotation stretches 2 x 60 sec ea   Sitting ball rollouts for spinal erector stretching x 15  Horiz abd stretch 2 x 60 sec B  UT stretch x 90 sec ea   Towel slides in flex on wall x 20   Pt overpressure rotation, Sbing and flex/ext x 10 ea   Seated fit ball retractions x 30   Supine head lifts for deep neck flexors  Standing 90/90 pec stretch 2 x 60 sec  Seated c/s AROM all directions  Standing towel slides in flex on wall 20x  Seated cervical extension over towel 10x  Seated cervical rotation with Pt overpressure 5x R/L Fit ball retrcations and Sbing seated x 20 ea       Prone hot pack cervical-5 min  Prone hot pack cervical-5 min  Prone hot pack cervical-5 min  Prone hot pack cerv-5 min  Prone hot pack cerv-5 min  Seated: cervical HP x5mins Prone hot pack cerv-5 min    HEP:   Access Code: [email or web address]  URL: [email or web address]  Date: 07/24/2024  Prepared by: Kevin Shipman    Exercises  - Supine Cervical Rotation AROM on Pillow  - 1 x daily - 7 x weekly - 3 sets - 10 reps  - Supine Cervical Sidebending  - 1 x daily - 7 x weekly - 3 sets - 10 reps  - Supine Chin Tuck  - 1 x daily - 7 x weekly  - 3 sets - 10 reps    Charges: M-2, TE-1       Total Timed Treatment: M-9, TE-29, Heat-5 min  Total Treatment Time: 43 min

## 2024-08-27 ENCOUNTER — OFFICE VISIT (OUTPATIENT)
Dept: PHYSICAL THERAPY | Age: 84
End: 2024-08-27
Attending: STUDENT IN AN ORGANIZED HEALTH CARE EDUCATION/TRAINING PROGRAM
Payer: MEDICARE

## 2024-08-27 PROCEDURE — 97110 THERAPEUTIC EXERCISES: CPT

## 2024-08-27 PROCEDURE — 97140 MANUAL THERAPY 1/> REGIONS: CPT

## 2024-08-27 NOTE — PROGRESS NOTES
Diagnosis:   S/P cervical spinal fusion (Z98.1)          Referring Provider: Marbella Damon  Date of Evaluation:    07/24/2024    Precautions:  History of CA  Next MD visit:   10/24  Date of Surgery: 04/16/24    Insurance Primary/Secondary: [facility] MEDICARE / N/A     # Auth Visits: 12            Subjective: Pt reports increased soreness in upper back musculature, no pain just stiffness.      Pain:   0/10      Objective:   SB: R 15, L 10   Flex: 28 degrees   Rotation: R: 34; L: 31    Assessment: Pt improved Sbing as compared to last session. Pt displayed severe reduction in ROM during open books suggesting thoracic spine hypomobility, that may be contributing to reduced neck AROM.        Goals:   Pt will improve cervical AROM flexion by 15 degrees to improve tolerance for looking down to tie shoes in 3 weeks.   Pt will improve cervical AROM extension by 15 degrees to improve tolerance for putting dishes into overhead cabinets in 3 weeks.    Pt will improve cervical AROM rotation to >35 degrees to improve tolerance for turning head to check blind spot while driving 6 weeks.   Pt will have improved sitting posture to WNL to look over shoulder while driving in 6 weeks.     Plan: Re-eval.    Date: 7/30/2024  TX#: 2/12 Date:                 TX#: 3/ Date:8/6/2024                 TX#: 4/ Date: 8/8/2024                TX#: 5/12 Date: 8/13/24  Tx#: 6/12 Date: 8/15/24  TX#: 7/12 Date: 8/20/24  TX#: 8/12 Date: 8/21/24  Tx#: 9/12 Date: 8/22/24  TX#: 10/12 Date: 8/27/24  TX#: 11/12   UBE-8 min Pulleys: flex, abd, scap-6 min  Pulleys: flex, abd, scap-6 min  Pulleys: flex, abd, scap-6 min  Pulleys: flex, abd, scap-6 min         STM suboccipital triangle and cervical musculature-8  Thoracic PA-8 STM suboccipital triangle and cervical musculature-7  Thoracic PA-7 STM suboccipital triangle and cervical musculature-8  Thoracic PA-8 STM suboccipital triangle and cervical musculature-14   STM suboccipital triangle and cervical  musculature-10 STM suboccipital triangle and cervical musculature-14 STM suboccipital triangle and cervical musculature-12 Assessed taut bands and identified TPS in UTs B; DN with 30mm needle to L UT in prone, RUT in supine without adverse reaction  Seated 1st and 2nd rib mobs Self myofascial release of upper back musculature- 6 min   STM neck and upper back musculature-9 min    SNAG cerv ext 10 x 5 sec hold  STM upper back and suboccipital-24  Seated fit ball retractions and Sbing x 30 ea   SNAG cerv ext and rotation 10 x 5 sec hold    Supine AROM SB and rotation  Sitting AROM flex/ ext, retraction/pro, LS    Supine AROM SB and rotation  Sitting AROM flex/ ext  SHLDR AAROM flex and ext x 15 ea   PT assisted snag x 3, 5 sec hold   Sitting AROM SB and rotation  Sitting AROM flex/ ext  SHLDR AAROM flex and ext x 15 ea   Cerv rotation snag x 3, 5 sec hold Func IR and ER stretch 2 x 60 sec   UT stretch 2 x 30 sec   Seated UE Asst flex and ext stretches 2 x 30 sec ea   UT stretch 2 x 30 sec  LS stretch 2 x 30 sec   Scalenes stretch 2 x 30 ea   Shoulder rolls fwd/bkwd x 20 ea  Seated UE Asst flex, ext and rotation stretches 2 x 60 sec ea   Sitting ball rollouts for spinal erector stretching x 15  Horiz abd stretch 2 x 60 sec B  UT stretch x 90 sec ea   Towel slides in flex on wall x 20   Pt overpressure rotation, Sbing and flex/ext x 10 ea   Seated fit ball retractions x 30   Supine head lifts for deep neck flexors  Standing 90/90 pec stretch 2 x 60 sec  Seated c/s AROM all directions  Standing towel slides in flex on wall 20x  Seated cervical extension over towel 10x  Seated cervical rotation with Pt overpressure 5x R/L Fit ball retrcations and Sbing seated x 20 ea   Cerv flex w/ PT OP x 10   Seated open books x 10 ea      Prone hot pack cervical-5 min  Prone hot pack cervical-5 min  Prone hot pack cervical-5 min  Prone hot pack cerv-5 min  Prone hot pack cerv-5 min  Seated: cervical HP x5mins Prone hot pack cerv-5 min   Prone hot pack cerv-5 min    HEP:   Access Code: [email or web address]  URL: [email or web address]  Date: 07/24/2024  Prepared by: Kevin Shipman    Exercises  - Supine Cervical Rotation AROM on Pillow  - 1 x daily - 7 x weekly - 3 sets - 10 reps  - Supine Cervical Sidebending  - 1 x daily - 7 x weekly - 3 sets - 10 reps  - Supine Chin Tuck  - 1 x daily - 7 x weekly - 3 sets - 10 reps    Charges: M-2, TE-1       Total Timed Treatment: M-24, TE-25, Heat-5 min  Total Treatment Time: 54 min

## 2024-08-29 ENCOUNTER — APPOINTMENT (OUTPATIENT)
Dept: PHYSICAL THERAPY | Age: 84
End: 2024-08-29
Attending: STUDENT IN AN ORGANIZED HEALTH CARE EDUCATION/TRAINING PROGRAM
Payer: MEDICARE

## 2024-09-03 ENCOUNTER — OFFICE VISIT (OUTPATIENT)
Dept: PHYSICAL THERAPY | Age: 84
End: 2024-09-03
Attending: STUDENT IN AN ORGANIZED HEALTH CARE EDUCATION/TRAINING PROGRAM
Payer: MEDICARE

## 2024-09-03 PROCEDURE — 97112 NEUROMUSCULAR REEDUCATION: CPT

## 2024-09-03 PROCEDURE — 97110 THERAPEUTIC EXERCISES: CPT

## 2024-09-03 PROCEDURE — 97530 THERAPEUTIC ACTIVITIES: CPT

## 2024-09-03 RX ORDER — FAMOTIDINE 20 MG/1
20 TABLET, FILM COATED ORAL NIGHTLY
Qty: 90 TABLET | Refills: 0 | OUTPATIENT
Start: 2024-09-03

## 2024-09-03 NOTE — PROGRESS NOTES
Diagnosis:   S/P cervical spinal fusion (Z98.1)    Referring Provider: Marbella Damon  Date of Evaluation: 7/24/2024  Precautions:  History of CA  Next MD visit:   Oct  Date of Surgery: 04/16/24          Progress Summary  Pt has attended 12, cancelled 0, and no shown 0 visits in Physical Therapy.     Subjective: Pt reports zero neck pain, but does report stiffness in the neck and shoulders. The stiffness keeps him from looking over his shoulder, reaching over the shoulder and makes overall neck movement highly uncomfortable.      Assessment: Pt improvements: sitting posture, cerv AROM, UE strength. Pt can continue to improve: joint mobility, sitting posture, cerv AROM and  strength. Pt's functional limitations: looking over his shoulder, reaching over the shoulder and makes overall neck movement highly uncomfortable. Therefore, the pt continues to display impairments that are directly impacting and leading to his functional deficits, these functional limitations will not improve w/out intervention making skilled physical therapy medically necessary.        Objective:     Observation/Posture: forward head, kyphotic  New: forward head.   Neuro Screen: In tact     Cervical AROM: (* denotes performed with pain)  Flexion: 15          New: 40  Extension: 15        New: 38  Sidebending: R 15; L 5   New: 20 R; L 15  Rotation: R 30; L 15      New: 30 R; 20 L      Accessory motion:   Central PA: severe hypomobility   New: Severe hypomobility  Unilateral PA R: Severe hypomobility   New: Severe hypomobility   Unilateral PA L: Severe hypomobility    New: Severe hypomobility   Palpation: TTP UT, LS, cervical paraspinals and suboccipital triangle    New: Right UT     Strength: (* denotes performed with pain)  UE/Scapular New UE R    Shoulder Flex: R 4-/5, L 5/5  Shoulder ABD (C5): R 4-/5, L 5/5  Biceps (C6): R 5/5, L 5/5  Wrist ext (C6): R 5/5, L 5/5  Triceps (C7): R 5/5, L 5/5  Wrist Flex (C7): R 5/5, L 5/5  Digit Flex (C8):  R 5/5, L 5/5  Thumb Ext (C8): R 5/5, L 5/5  Interossei (T1): R 5/5, L 5/5   strength: R: 46 lbs L: 63 lbs     Flex: 5/5   Abd: 5/5  : 44.5            Additional Interventions:  Seated open books x 20 ea  Thoracic and cerv PA grade 2-10 min   Seated thoracic ext FR x 30   Seated ball rollouts red x 10   LS stretch 2 x 90 sec       Charges: TA, TE, NM;  TA-13, TE-19, NM-12;  Total 44 min     Goals: (to be met in 12 visits)   Pt will improve cervical AROM flexion by 15 degrees to improve tolerance for looking down to tie shoes in 3 weeks.MET  Pt will improve cervical AROM extension by 15 degrees to improve tolerance for putting dishes into overhead cabinets in 3 weeks.MET    Pt will improve cervical AROM rotation to >35 degrees to improve tolerance for turning head to check blind spot while driving 6 weeks. In progress  Pt will have improved sitting posture to WNL to look over shoulder while driving in 6 weeks. In progress  Pt will improve cerv Sbing AROM to 30 degrees B to isma shirt w/out discomfort. In progress  Pt will improve TTP in R UT to relieve discomfort duirng gneeral neck movement. In progress       Rehab Potential: good    Plan: Continue skilled Physical Therapy 2 x/week or a total of 8 visits over a 4 week period. Treatment will include: neuromuscular re-education, stretching, joint mobility, strengthening and home exercise program.        Patient/Family/Caregiver was advised of these findings, precautions, and treatment options and has agreed to actively participate in planning and for this course of care.    Thank you for your referral. If you have any questions, please contact me at Dept: [phone].    Sincerely,  Electronically signed by therapist: Hawk Shipman    Physician's certification required: Yes  Please co-sign or sign and return this letter via fax as soon as possible to [phone].   I certify the need for these services furnished under this plan of treatment and while  under my care.    X___________________________________________________ Date____________________    Certification From: 9/3/2024  To:12/2/2024

## 2024-09-05 ENCOUNTER — ORDER TRANSCRIPTION (OUTPATIENT)
Dept: PHYSICAL THERAPY | Facility: HOSPITAL | Age: 84
End: 2024-09-05

## 2024-09-05 DIAGNOSIS — Z98.1 S/P CERVICAL SPINAL FUSION: Primary | ICD-10-CM

## 2024-09-10 RX ORDER — FAMOTIDINE 20 MG/1
20 TABLET, FILM COATED ORAL NIGHTLY
Qty: 90 TABLET | Refills: 0 | OUTPATIENT
Start: 2024-09-10

## 2024-09-30 ENCOUNTER — OFFICE VISIT (OUTPATIENT)
Dept: PHYSICAL THERAPY | Age: 84
End: 2024-09-30
Payer: MEDICARE

## 2024-09-30 DIAGNOSIS — Z98.1 S/P CERVICAL SPINAL FUSION: Primary | ICD-10-CM

## 2024-09-30 PROCEDURE — 97140 MANUAL THERAPY 1/> REGIONS: CPT

## 2024-09-30 PROCEDURE — 97110 THERAPEUTIC EXERCISES: CPT

## 2024-09-30 NOTE — PROGRESS NOTES
Diagnosis:   S/P cervical spinal fusion (Z98.1)         Referring Provider: Myrna Gardiner  Date of Evaluation:    07/24/2024     Precautions:  Hist of CA Next MD visit:   none scheduled  Date of Surgery: n/a   Insurance Primary/Secondary: [employer] [facility] MEDICARE / N/A     # Auth Visits: 8            Subjective: The neck is about the same as last time, stiffness is main complaint.     Pain: 2-3/10, mostly stiffness.       Objective:   Thoracic CPA: severe hypomobility.   Flex: 30, 35 following inter scap strength  Ext: 40, 45 following inter scap strength    SB: 20 R; 25 L     Assessment: Pt highly sensitive to thoracic CPA, very light pressure used. Pt responded well to thoracic mobility and cerv stretching with improved cerv AROM.        Goals:   Pt will improve cervical AROM flexion by 15 degrees to improve tolerance for looking down to tie shoes in 3 weeks.   Pt will improve cervical AROM extension by 15 degrees to improve tolerance for putting dishes into overhead cabinets in 3 weeks.    Pt will improve cervical AROM rotation to >35 degrees to improve tolerance for turning head to check blind spot while driving 6 weeks.   Pt will have improved sitting posture to WNL to look over shoulder while driving in 6 weeks.    Plan: Cont improving thoracic mobility.   Date: 9/30/2024  TX#: 1/8 Date:                 TX#: 3/ Date:                 TX#: 4/ Date:                 TX#: 5/ Date:   Tx#: 6/   UBE-4 min        Thoracic CPA garde 1-25 min       SL open book x 15 ea  Supine thoracic ext 5# 3 x 60 sec   Assessment of cerv AROM  Seated horiz abd GTB 2 x 15   Towel Asst cerv flex 2 x 30 sec  Towel Asst cerv ext 2 x 30 sec               HEP:   Access Code: [email or web address]  URL: [email or web address]  Date: 07/24/2024  Prepared by: Kevin Shipman     Exercises  - Supine Cervical Rotation AROM on Pillow  - 1 x daily - 7 x weekly - 3 sets - 10 reps  - Supine Cervical Sidebending  - 1 x daily - 7 x weekly - 3  sets - 10 reps  - Supine Chin Tuck  - 1 x daily - 7 x weekly - 3 sets - 10 reps    Charges: M-2, TE       Total Timed Treatment: M-25, TE-21 min  Total Treatment Time: 46 min

## 2024-10-02 ENCOUNTER — APPOINTMENT (OUTPATIENT)
Dept: PHYSICAL THERAPY | Age: 84
End: 2024-10-02
Attending: INTERNAL MEDICINE
Payer: MEDICARE

## 2024-10-07 ENCOUNTER — OFFICE VISIT (OUTPATIENT)
Dept: PHYSICAL THERAPY | Age: 84
End: 2024-10-07
Payer: MEDICARE

## 2024-10-07 PROCEDURE — 97140 MANUAL THERAPY 1/> REGIONS: CPT

## 2024-10-07 PROCEDURE — 97110 THERAPEUTIC EXERCISES: CPT

## 2024-10-07 NOTE — PROGRESS NOTES
Diagnosis:   S/P cervical spinal fusion (Z98.1)         Referring Provider: Myrna Gardiner  Date of Evaluation:    07/24/2024     Precautions:  Hist of CA Next MD visit:   none scheduled  Date of Surgery: n/a   Insurance Primary/Secondary: [employer] [facility] MEDICARE / N/A     # Auth Visits: 8            Subjective: The neck is about the same as last time, stiffness is main complaint.     Pain: 1-2/10, mm tightness       Objective:   Flex: 33, 45  Ext: 25, 35   SB: 25 R; 20 L, 30 R; 25 L   Rot: 38 R; 25 L, 40 B    Assessment: Pt responded well to thoracic back mm STM, all Cerv AROM besides Sbing. Pt's Sbing improved following towel Asst UT stretch. Pt's AROM restrictions appear to be due to mm tension.            Goals:   Pt will improve cervical AROM flexion by 15 degrees to improve tolerance for looking down to tie shoes in 3 weeks.   Pt will improve cervical AROM extension by 15 degrees to improve tolerance for putting dishes into overhead cabinets in 3 weeks.    Pt will improve cervical AROM rotation to >35 degrees to improve tolerance for turning head to check blind spot while driving 6 weeks.   Pt will have improved sitting posture to WNL to look over shoulder while driving in 6 weeks.    Plan: Cont relieving mm tension.     Date: 9/30/2024  TX#: 1/8 Date: 10/7/24                TX#: 2/8 Date:                 TX#: 4/ Date:                 TX#: 5/ Date:   Tx#: 6/   UBE-4 min        Thoracic CPA garde 1-25 min STM thoracic back mm-24 min        SL open book x 15 ea  Supine thoracic ext 5# 3 x 60 sec   Assessment of cerv AROM  Seated horiz abd GTB 2 x 15   Towel Asst cerv flex 2 x 30 sec  Towel Asst cerv ext 2 x 30 sec  Seated cable rows 40# 3 x 20   Seated thoracic ext FR x 30  Seated thoracic rot DR x 40  Towel Asst UT stretch 2 x 90 sec ea             HEP:   Access Code: [email or web address]  URL: [email or web address]  Date: 07/24/2024  Prepared by: Kevin Shipman     Exercises  - Supine Cervical Rotation  AROM on Pillow  - 1 x daily - 7 x weekly - 3 sets - 10 reps  - Supine Cervical Sidebending  - 1 x daily - 7 x weekly - 3 sets - 10 reps  - Supine Chin Tuck  - 1 x daily - 7 x weekly - 3 sets - 10 reps    Charges: M-2, TE       Total Timed Treatment: M-24, TE-19 min  Total Treatment Time: 43 min

## 2024-10-10 ENCOUNTER — OFFICE VISIT (OUTPATIENT)
Dept: PHYSICAL THERAPY | Age: 84
End: 2024-10-10
Attending: INTERNAL MEDICINE
Payer: MEDICARE

## 2024-10-10 PROCEDURE — 97110 THERAPEUTIC EXERCISES: CPT

## 2024-10-10 PROCEDURE — 97140 MANUAL THERAPY 1/> REGIONS: CPT

## 2024-10-10 NOTE — PROGRESS NOTES
Diagnosis:   S/P cervical spinal fusion (Z98.1)         Referring Provider: Myrna Gardiner  Date of Evaluation:    07/24/2024     Precautions:  Hist of CA Next MD visit:   none scheduled  Date of Surgery: n/a   Insurance Primary/Secondary: [employer] [facility] MEDICARE / N/A     # Auth Visits: 8            Subjective: Pt reports the neck starting to loosen up, but baseline stiffness remains.     Pain: 2/10      Objective:   Thoracic CPA: Mild-mod hypomobility   SB: 14 R; 17 L;   15 R ; 18 L      Assessment: Pt's spinal joint motion starting to improve, was severe now mod. Pt improved AROM during seated rows. Pt made small improvements to Sbing following towel assisted.       Goals:   Pt will improve cervical AROM flexion by 15 degrees to improve tolerance for looking down to tie shoes in 3 weeks.   Pt will improve cervical AROM extension by 15 degrees to improve tolerance for putting dishes into overhead cabinets in 3 weeks.    Pt will improve cervical AROM rotation to >35 degrees to improve tolerance for turning head to check blind spot while driving 6 weeks.   Pt will have improved sitting posture to WNL to look over shoulder while driving in 6 weeks.    Plan: Cont improving spinal mobility and interscapular strength.     Date: 9/30/2024  TX#: 1/8 Date: 10/7/24                TX#: 2/8 Date:10/10/24                 TX#: 3/8 Date:                 TX#: 5/ Date:   Tx#: 6/   UBE-4 min   UBE 1/1      Thoracic CPA garde 1-25 min STM thoracic back mm-24 min   Thoracic CPA grade2-3 -13 min  STM interscapular mm-13 min     SL open book x 15 ea  Supine thoracic ext 5# 3 x 60 sec   Assessment of cerv AROM  Seated horiz abd GTB 2 x 15   Towel Asst cerv flex 2 x 30 sec  Towel Asst cerv ext 2 x 30 sec  Seated cable rows 40# 3 x 20   Seated thoracic ext FR x 30  Seated thoracic rot DR x 40  Towel Asst UT stretch 2 x 90 sec ea Prone horiz abd 2# 2 x 15 ea  Seated cable row 45# 3 x 15   Supine thoracic ext 3# 3 x 30 sec               HEP:   Access Code: [email or web address]  URL: [email or web address]  Date: 07/24/2024  Prepared by: Kevin Shipman     Exercises  - Supine Cervical Rotation AROM on Pillow  - 1 x daily - 7 x weekly - 3 sets - 10 reps  - Supine Cervical Sidebending  - 1 x daily - 7 x weekly - 3 sets - 10 reps  - Supine Chin Tuck  - 1 x daily - 7 x weekly - 3 sets - 10 reps    Charges: M-2, TE       Total Timed Treatment: M-26, TE-20 min  Total Treatment Time: 46 min

## 2024-10-14 ENCOUNTER — OFFICE VISIT (OUTPATIENT)
Dept: PHYSICAL THERAPY | Age: 84
End: 2024-10-14
Payer: MEDICARE

## 2024-10-14 PROCEDURE — 97140 MANUAL THERAPY 1/> REGIONS: CPT

## 2024-10-14 PROCEDURE — 97110 THERAPEUTIC EXERCISES: CPT

## 2024-10-16 ENCOUNTER — TELEPHONE (OUTPATIENT)
Dept: PHYSICAL THERAPY | Age: 84
End: 2024-10-16

## 2024-10-16 ENCOUNTER — OFFICE VISIT (OUTPATIENT)
Dept: PHYSICAL THERAPY | Age: 84
End: 2024-10-16
Attending: INTERNAL MEDICINE
Payer: MEDICARE

## 2024-10-16 PROCEDURE — 97140 MANUAL THERAPY 1/> REGIONS: CPT

## 2024-10-16 PROCEDURE — 97110 THERAPEUTIC EXERCISES: CPT

## 2024-10-16 NOTE — PROGRESS NOTES
Diagnosis:   S/P cervical spinal fusion (Z98.1)         Referring Provider: Myrna Gardiner  Date of Evaluation:    07/24/2024     Precautions:  Hist of CA Next MD visit:   none scheduled  Date of Surgery: n/a   Insurance Primary/Secondary: [employer] MEDICARE / N/A     # Auth Visits: 8            Subjective: Pt reports the neck is slowly starting to feel less     Pain:  2-3/10      Objective:   SB: R 12;  L 20    Assessment: Pt's back displayed many trigger points, especially near the upper traps. The pt's Sbing improved following scalene stretches. Pt's mm tension slowly improving, which is likely correlating w/ improvements in AROM.         Goals:   Pt will improve cervical AROM flexion by 15 degrees to improve tolerance for looking down to tie shoes in 3 weeks.   Pt will improve cervical AROM extension by 15 degrees to improve tolerance for putting dishes into overhead cabinets in 3 weeks.    Pt will improve cervical AROM rotation to >35 degrees to improve tolerance for turning head to check blind spot while driving 6 weeks.   Pt will have improved sitting posture to WNL to look over shoulder while driving in 6 weeks.    Plan: Continue improving neck AROM  Date: 9/30/2024  TX#: 1/8 Date: 10/7/24                TX#: 2/8 Date:10/10/24                 TX#: 3/8 Date:10/14/24                 TX#: 4/8 Date: 10/16/24  Tx#: 5/8   UBE-4 min   UBE 1/1  UBE 3/3 UBE 2/2   Thoracic CPA garde 1-25 min STM thoracic back mm-24 min   Thoracic CPA grade2-3 -13 min  STM interscapular mm-13 min Thoracic CPA grade2-3 -11 min  STM interscapular mm-13 min Thoracic and cerv CPA grade 2-3 - 16 min   STM interscapular mm- 10 min    SL open book x 15 ea  Supine thoracic ext 5# 3 x 60 sec   Assessment of cerv AROM  Seated horiz abd GTB 2 x 15   Towel Asst cerv flex 2 x 30 sec  Towel Asst cerv ext 2 x 30 sec  Seated cable rows 40# 3 x 20   Seated thoracic ext FR x 30  Seated thoracic rot DR x 40  Towel Asst UT stretch 2 x 90 sec ea  Prone horiz abd 2# 2 x 15 ea  Seated cable row 45# 3 x 15   Supine thoracic ext 3# 3 x 30 sec   Seated cable row 50# 3 x 20   Prone horiz abd 4# 2 x 15 ea, 1# x 30 ea    Seated thoracic ext w/ PT asst x 15    Ant and post scalene stretch R x 90 sec ea  Towel Asst ext 3 x 60 sec  Seated fit ball cerv retractions 3 x 30 sec           HEP:   Access Code: [email or web address]  URL: [email or web address]  Date: 07/24/2024  Prepared by: Kevin Shipman     Exercises  - Supine Cervical Rotation AROM on Pillow  - 1 x daily - 7 x weekly - 3 sets - 10 reps  - Supine Cervical Sidebending  - 1 x daily - 7 x weekly - 3 sets - 10 reps  - Supine Chin Tuck  - 1 x daily - 7 x weekly - 3 sets - 10 reps    Charges: M-2, TE       Total Timed Treatment: M-26, TE-18 min  Total Treatment Time: 44 min

## 2024-10-21 NOTE — PROGRESS NOTES
Diagnosis:   S/P cervical spinal fusion (Z98.1)         Referring Provider: Myrna Gardiner  Date of Evaluation:    07/24/2024     Precautions:  Hist of CA Next MD visit:   none scheduled  Date of Surgery: n/a   Insurance Primary/Secondary: [employer] [facility] MEDICARE / N/A     # Auth Visits: 8            Subjective: Pt reports the R side of the neck feels tighter than the L, and would like to rotate to each side equally.     Pain:  0/10, just stiffness.      Objective:   Rotation:  30 R; 35 L   Thoracic CPA: mod hypomobility   Shoulder Flex: 135    Assessment: Pt's thoracic joint mobility starting to improve from severe hypomobility to moderate. Pt required tactile and visual cueing to improve kyphotic posture during doorway pec stretch.         Goals:   Pt will improve cervical AROM flexion by 15 degrees to improve tolerance for looking down to tie shoes in 3 weeks.   Pt will improve cervical AROM extension by 15 degrees to improve tolerance for putting dishes into overhead cabinets in 3 weeks.    Pt will improve cervical AROM rotation to >35 degrees to improve tolerance for turning head to check blind spot while driving 6 weeks.   Pt will have improved sitting posture to WNL to look over shoulder while driving in 6 weeks.    Plan: Continue improving thoracic mobility.  Date:10/14/24                 TX#: 4/8 Date: 10/16/24  Tx#: 5/8 Date: 10/22/24  6/8   UBE 3/3 UBE 2/2 UBE 3/3    Thoracic CPA grade2-3 -11 min  STM interscapular mm-13 min Thoracic and cerv CPA grade 2-3 - 16 min   STM interscapular mm- 10 min  Cerv upglides grade 1-2 -9 min  Thoracic and cerv CPA grade 2-3-8 min   STM interscapular and suboccipital mm-6 min    Seated cable row 50# 3 x 20   Prone horiz abd 4# 2 x 15 ea, 1# x 30 ea    Seated thoracic ext w/ PT asst x 15    Ant and post scalene stretch R x 90 sec ea  Towel Asst ext 3 x 60 sec  Seated fit ball cerv retractions 3 x 30 sec  Prone horiz abd 4# 2 x 10 ea  Doorway pec stretch x 120 sec    Cable rows 55# 2 x 20   Supine thoracic ext over towel roll 2# x 90 sec           HEP:   Access Code: [email or web address]  URL: [email or web address]  Date: 07/24/2024  Prepared by: Kevin Shipman     Exercises  - Supine Cervical Rotation AROM on Pillow  - 1 x daily - 7 x weekly - 3 sets - 10 reps  - Supine Cervical Sidebending  - 1 x daily - 7 x weekly - 3 sets - 10 reps  - Supine Chin Tuck  - 1 x daily - 7 x weekly - 3 sets - 10 reps    Charges: M-2, TE-2       Total Timed Treatment: M-23, TE-32 min  Total Treatment Time: 55 min

## 2024-10-22 ENCOUNTER — OFFICE VISIT (OUTPATIENT)
Dept: PHYSICAL THERAPY | Age: 84
End: 2024-10-22
Attending: INTERNAL MEDICINE
Payer: MEDICARE

## 2024-10-22 ENCOUNTER — ORDER TRANSCRIPTION (OUTPATIENT)
Dept: PHYSICAL THERAPY | Facility: HOSPITAL | Age: 84
End: 2024-10-22

## 2024-10-22 DIAGNOSIS — Z98.1 S/P CERVICAL SPINAL FUSION: Primary | ICD-10-CM

## 2024-10-22 PROCEDURE — 97140 MANUAL THERAPY 1/> REGIONS: CPT

## 2024-10-22 PROCEDURE — 97110 THERAPEUTIC EXERCISES: CPT

## 2024-10-29 ENCOUNTER — OFFICE VISIT (OUTPATIENT)
Dept: PHYSICAL THERAPY | Age: 84
End: 2024-10-29
Attending: INTERNAL MEDICINE
Payer: MEDICARE

## 2024-10-29 PROCEDURE — 97110 THERAPEUTIC EXERCISES: CPT

## 2024-10-29 PROCEDURE — 97140 MANUAL THERAPY 1/> REGIONS: CPT

## 2024-10-29 NOTE — PROGRESS NOTES
Diagnosis:   S/P cervical spinal fusion (Z98.1)         Referring Provider: Myrna Gardiner  Date of Evaluation:    07/24/2024     Precautions:  Hist of CA Next MD visit:   none scheduled  Date of Surgery: n/a   Insurance Primary/Secondary: [employer] MEDICARE / N/A     # Auth Visits: 8            Subjective: Pt reports the neck feels less restricted than last visit.     Pain:  0-1/10, just stiffness.      Objective:   Cerv Rotation:  35 R; L 40   Thoracic CPA: mild-mod hypomobility   Shoulder Flex: 145 R    Assessment: Pt's thoracic joint mobility continues to improve, but further improvements are possible. Pt required visual and tactile cueing to assume proper thoracic posture during shldr flex and abd.        Goals:   Pt will improve cervical AROM flexion by 15 degrees to improve tolerance for looking down to tie shoes in 3 weeks.   Pt will improve cervical AROM extension by 15 degrees to improve tolerance for putting dishes into overhead cabinets in 3 weeks.    Pt will improve cervical AROM rotation to >35 degrees to improve tolerance for turning head to check blind spot while driving 6 weeks.   Pt will have improved sitting posture to WNL to look over shoulder while driving in 6 weeks.    Plan: D/C  Date:10/14/24                 TX#: 4/8 Date: 10/16/24  Tx#: 5/8 Date: 10/22/24  6/8 10/29/24  7/8   UBE 3/3 UBE 2/2 UBE 3/3  UBE 2/2    Thoracic CPA grade2-3 -11 min  STM interscapular mm-13 min Thoracic and cerv CPA grade 2-3 - 16 min   STM interscapular mm- 10 min  Cerv upglides grade 1-2 -9 min  Thoracic and cerv CPA grade 2-3-8 min   STM interscapular and suboccipital mm-6 min  PROM Shldr: flex, abd, horiz abd/add-11 min   Thoracic CPA grade2-3- 13 min     Seated cable row 50# 3 x 20   Prone horiz abd 4# 2 x 15 ea, 1# x 30 ea    Seated thoracic ext w/ PT asst x 15    Ant and post scalene stretch R x 90 sec ea  Towel Asst ext 3 x 60 sec  Seated fit ball cerv retractions 3 x 30 sec  Prone horiz abd 4# 2 x 10  ea  Doorway pec stretch x 120 sec   Cable rows 55# 2 x 20   Supine thoracic ext over towel roll 2# x 90 sec    Prone horiz abd 3# 2 x 10 ea   Seated shldr flex 1and abd 1# 2 x 15 ea  Seated cable forws w/ 1/2 FR 55# 3 x 20  Seated cerv retraction into fit ball x 50          HEP:   Access Code: [email or web address]  URL: [email or web address]  Date: 07/24/2024  Prepared by: Kevin Shipman     Exercises  - Supine Cervical Rotation AROM on Pillow  - 1 x daily - 7 x weekly - 3 sets - 10 reps  - Supine Cervical Sidebending  - 1 x daily - 7 x weekly - 3 sets - 10 reps  - Supine Chin Tuck  - 1 x daily - 7 x weekly - 3 sets - 10 reps    Charges: M-2, TE-1       Total Timed Treatment: M-24, TE-21 min  Total Treatment Time: 45 min

## 2024-11-06 ENCOUNTER — OFFICE VISIT (OUTPATIENT)
Dept: PHYSICAL THERAPY | Age: 84
End: 2024-11-06
Attending: INTERNAL MEDICINE
Payer: MEDICARE

## 2024-11-06 PROCEDURE — 97530 THERAPEUTIC ACTIVITIES: CPT

## 2024-11-06 NOTE — PROGRESS NOTES
Diagnosis:   S/P cervical spinal fusion (Z98.1)          Referring Provider: Myrna Gardiner  Date of Evaluation:    07/24/2024     Precautions:  Hist of CA Next MD visit:   none scheduled  Date of Surgery: n/a   Insurance Primary/Secondary: [employer] MEDICARE / N/A     # Auth Visits: 8           Discharge Summary  Pt has attended 20 visits in Physical Therapy.     Subjective: Pt reports looking over the shoulder while driving has improved. Overall the neck has improved greatly, further improvements could be made but he is happy. No reports of pain just soreness.     Assessment: Pt has made notable improvements in posture, ROM, strength, joint motion, and muscle tension. Pt has minor remaining deficits that may not improve, but pt has agreed to cont addressing these deficits independently. Pt is able to perform all ADLs w/out restriction/modification. Pt has restored baseline function to the best physical therapy can provide, and his improvements should remain if HEP is adhered to on the agreed on schedule.       Objective:   Observation/Posture: forward head, kyphotic   New: min forward head and kyphotic posture when sitting.      Cervical AROM: (* denotes performed with pain)  Flexion: 15                    New: 25  Extension: 15                 New: 30  Sidebending: R 15; L 5   New: R 15; L 12  Rotation: R 30; L 15         New: R 35; L 30     Accessory motion:   Central PA: severe hypomobility                New: mild-mod hypo  Unilateral PA R: Severe hypomobility        New: mild-mod hypo  Unilateral PA L: Severe hypomobility          New: mild-mod hypo    Palpation: TTP UT, LS, cervical paraspinals and suboccipital triangle    New: No TTP     Strength: (* denotes performed with pain)  UE/Scapular New   Shoulder Flex: R 4-/5, L 5/5  Shoulder ABD (C5): R 4-/5, L 5/5  Biceps (C6): R 5/5, L 5/5  Wrist ext (C6): R 5/5, L 5/5  Triceps (C7): R 5/5, L 5/5  Wrist Flex (C7): R 5/5, L 5/5  Digit Flex (C8): R 5/5, L  5/5  Thumb Ext (C8): R 5/5, L 5/5  Interossei (T1): R 5/5, L 5/5   strength: R: 46 lbs L: 63 lbs     Shldr Flex: R 5/5  Shldr Abd: R 5/5   strength: 45 R; 53 L           Goals:   Pt will improve cervical AROM flexion by 15 degrees to improve tolerance for looking down to tie shoes in 3 weeks. Improving  Pt will improve cervical AROM extension by 15 degrees to improve tolerance for putting dishes into overhead cabinets in 3 weeks. Met    Pt will improve cervical AROM rotation to >35 degrees to improve tolerance for turning head to check blind spot while driving 6 weeks. Improving   Pt will have improved sitting posture to WNL to look over shoulder while driving in 6 weeks. Improving      Charges: TA-3;  TA-39;  Total: 39 min     Patient/Family/Caregiver was advised of these findings, precautions, and treatment options and has agreed to actively participate in planning and for this course of care.    Thank you for your referral. If you have any questions, please contact me at Dept: [phone].    Sincerely,  Electronically signed by therapist: Hawk Shipman    Physician's certification required: No  Please co-sign or sign and return this letter via fax as soon as possible to [phone].   I certify the need for these services furnished under this plan of treatment and while under my care.    X___________________________________________________ Date____________________    Certification From: 11/6/2024  To:2/4/2025

## 2024-11-12 ENCOUNTER — OFFICE VISIT (OUTPATIENT)
Dept: PHYSICAL THERAPY | Age: 84
End: 2024-11-12
Attending: INTERNAL MEDICINE
Payer: MEDICARE

## 2024-11-12 PROCEDURE — 97161 PT EVAL LOW COMPLEX 20 MIN: CPT

## 2024-11-12 PROCEDURE — 97110 THERAPEUTIC EXERCISES: CPT

## 2024-11-12 PROCEDURE — 97530 THERAPEUTIC ACTIVITIES: CPT

## 2024-11-12 NOTE — PROGRESS NOTES
LOWER EXTREMITY EVALUATION:     Diagnosis:   Gait disorder  Balance dysfunction        Referring Provider: Myrna Gardiner  Date of Evaluation:    11/12/2024    Precautions:  Hist of CA and syncope Next MD visit:   none scheduled  Date of Surgery: n/a     PATIENT SUMMARY   Aroldo Greco is a 83 year old male who presents to therapy today with complaints of back stiffness, leg weakness and poor balance. Pt has Hist of compressed spinal nerve starting in 2014, sxs started as limp and progressively resulted in a decrease in function. Pt has tried physical therapy in the past, but found little improvement. Pt states prev mentioned sxs are constant, and nothing has improved sxs.   Pt describes pain level current 0/10, at best 0/10, at worst 0/10.    Current functional limitations include walking w/out cane, stairs w/out railing, high difficulty getting out of bed and low surfaces.      Aroldo describes prior level of function able to Amb w/out cane, in 2014.   Past medical history was reviewed with Aroldo, no additional findings related to the back.     ASSESSMENT  Aroldo presents to physical therapy evaluation with primary c/o balance dysfunction. The results of the objective tests and measures show Sub optimal standing posture, reduced strength and ROM, painful palpation of vertebrae and paraspinal mm, limited accessory motion and flexibility, dysfunctional: gait, balance and bed mobility. Functional deficits include but are not limited to walking w/out cane, stairs w/out railing, high difficulty getting out of bed and low surfaces. Signs and symptoms are consistent with diagnosis of Balance dysfunction. Pt and PT discussed evaluation findings, pathology, POC and HEP.  Pt voiced understanding and performs HEP correctly without reported pain. Skilled Physical Therapy is medically necessary to address the above impairments and reach functional goals.     OBJECTIVE:   Observation:   -Standing posture: Forward  weight distribution and mildly kyphotic posture  Palpation: severe TTP vertebrae and paraspinal mm.  Sensation: Light touch in tact.     AROM: (* denotes performed with pain)  Hip Knee Spine   Flexion: R 90; L 90  Abduction: R 20; L 20  ER: diminished B  IR: diminished B  Flexion: R 95; L 100  Extension: R 0; L 0    Flex: 75%  Ext: 25%   Rot: 50% B   SB: 75% R; 100% L      Accessory motion:(* denotes pain)    Lumbar CPA: severe hypomobility*    Flexibility:  Hip Flexor: + B  Hamstrings: + B   Piriformis: + B   Quads: + B       Strength/MMT: (* denotes performed with pain)  Hip Knee   Flexion: R 4-/5; L 4/5  Abduction: R 3+/5; L 4-/5  ER: R 4-/5; L 4/5  IR: R 4-/5; L 4+/5 Flexion: R 5/5; L 5/5  Extension: R 5/5; L 5/5            Bed mobility:  All supervisory level of assistance, but pt was close to falling out of bed during supine to sit transition.   -sit to Supine:  -Supine to sidelying:  -Sidelying to supine:   -Supine to sit:     Gait: pt ambulates on level ground with unilat straight can, severe med/lat sway, decreased: heel strike, push off and speed.      Balance:   SLS: R 2 sec, L 1 sec    4 stage balance:  -1: 30 sec   -2: 30 sec, mod sway  -3: 5 sec, severe sway  Fall Risk if less than 10 sec   -4: 1 sec, severe sway     Parr: pt used unilt straight cane, 23 seconds Fall risk if less than 13 seconds     Today’s Treatment and Response:   Pt education was provided on exam findings, treatment diagnosis, treatment plan, expectations, and prognosis. Pt was also provided recommendations for activity modifications, possible soreness after evaluation, postural corrections, ergonomics, and strategies to reduce fall risk at home.  Patient was instructed in and issued a HEP for:   Access Code: [email or web address]  URL: [email or web address]  Date: 11/12/2024  Prepared by: Kevin Shipman    Exercises  - Static Prone on Elbows  - 3 x daily - 7 x weekly - 10 reps - 60 hold  - Supine 90/90 Alternating Toe Touch  -  3 x daily - 7 x weekly - 20 reps  - Hooklying Clamshell with Resistance  - 3 x daily - 7 x weekly - 30 reps    Charges: PT Eval Low Complexity      Total Timed Treatment: Eval-20, TA-15, TE-10 min     Total Treatment Time: 45 min     Based on clinical rationale and outcome measures, this evaluation involved Low Complexity decision making due to 3+ personal factors/comorbidities, 4+ body structures involved/activity limitations, and unstable symptoms including changing pain levels.  PLAN OF CARE:    Goals: (to be met in 12 visits)  Pt will improve ext ROM to 75% in order to perform bed mobility WNL.  Pt will improve hip flex ROM to 110 degrees B to improve TUG to 18 seconds.  Pt will improve hip abd strength to 4/5 B to walk 0.1 miles w/out cane.  Pt will improve accessory motion to WNL to climb stairs w/out railing.       Frequency / Duration: Patient will be seen for a total of 12 visits over a 90 day period. Treatment will include: Gait training, Manual Therapy, Neuromuscular Re-education, Therapeutic Activities, Therapeutic Exercise, and Home Exercise Program instruction    Education or treatment limitation: None  Rehab Potential:good        Patient/Family/Caregiver was advised of these findings, precautions, and treatment options and has agreed to actively participate in planning and for this course of care.    Thank you for your referral. Please co-sign or sign and return this letter via fax as soon as possible to [phone]. If you have any questions, please contact me at Dept: [phone]    Sincerely,  Electronically signed by therapist: Hawk Shipman  Physician's certification required: Yes  I certify the need for these services furnished under this plan of treatment and while under my care.    X___________________________________________________ Date____________________    Certification From: 11/12/2024  To:2/10/2025

## 2024-11-18 ENCOUNTER — OFFICE VISIT (OUTPATIENT)
Dept: PHYSICAL THERAPY | Age: 84
End: 2024-11-18
Attending: INTERNAL MEDICINE
Payer: MEDICARE

## 2024-11-18 PROCEDURE — 97110 THERAPEUTIC EXERCISES: CPT

## 2024-11-18 PROCEDURE — 97140 MANUAL THERAPY 1/> REGIONS: CPT

## 2024-11-18 NOTE — PROGRESS NOTES
Diagnosis:   Gait disorder  Balance dysfunction      Referring Provider: Myrna Gardiner  Date of Evaluation:    11/12/2024     Precautions:  Hist of CA and syncope  Next MD visit:   none scheduled  Date of Surgery: n/a   Insurance Primary/Secondary: [employer] MEDICARE / N/A     # Auth Visits: 4, POC 12            Subjective: Pt reports back and hips are feeling fine after the last visit.     Pain: 0/10      Objective:   Thoracic CPA: mod-severe hypomobility     Assessment: Both hips mod-severely hypomobile during PROM, likely contributing to mobility and balance dysfunction. Pt noticeably improved sway during tandem walks following each repetition.       Goals: (to be met in 12 visits)  Pt will improve ext ROM to 75% in order to perform bed mobility WNL.  Pt will improve hip flex ROM to 110 degrees B to improve TUG to 18 seconds.  Pt will improve hip abd strength to 4/5 B to walk 0.1 miles w/out cane.  Pt will improve accessory motion to WNL to climb stairs w/out railing.     Plan: Add deadbugs   Date: 11/18/2024  TX#: 2/12 Date:                 TX#: 3/ Date:                 TX#: 4/ Date:                 TX#: 5/ Date:   Tx#: 6/   Lumbar CPA grade 2 -12 min   Hip PROM: flex, abd, ER/IR-14 min        Standing hip flex, abd and ext RTB x 20 ea   Squats by TM RTB around knees x 30  Tandem walking x 4 laps                      HEP: Access Code: [email or web address]  URL: [email or web address]  Date: 11/12/2024  Prepared by: Kevin Shipman     Exercises  - Static Prone on Elbows  - 3 x daily - 7 x weekly - 10 reps - 60 hold  - Supine 90/90 Alternating Toe Touch  - 3 x daily - 7 x weekly - 20 reps  - Hooklying Clamshell with Resistance  - 3 x daily - 7 x weekly - 30 reps    Charges: M-2, TE-1       Total Timed Treatment: M-26, TE-20 min  Total Treatment Time: 46 min

## 2024-11-20 ENCOUNTER — OFFICE VISIT (OUTPATIENT)
Dept: PHYSICAL THERAPY | Age: 84
End: 2024-11-20
Attending: INTERNAL MEDICINE
Payer: MEDICARE

## 2024-11-20 PROCEDURE — 97140 MANUAL THERAPY 1/> REGIONS: CPT

## 2024-11-20 PROCEDURE — 97110 THERAPEUTIC EXERCISES: CPT

## 2024-11-20 NOTE — PROGRESS NOTES
Diagnosis:   Gait disorder  Balance dysfunction      Referring Provider: Myrna Gardiner  Date of Evaluation:    11/12/2024     Precautions:  Hist of CA and syncope  Next MD visit:   none scheduled  Date of Surgery: n/a   Insurance Primary/Secondary: [employer] MEDICARE / N/A     # Auth Visits: 4, POC 12            Subjective: Pt reports mild ache immediately after therapy, improved w/in 24 hours.     Pain: 0/10      Objective:   Lumbar ext: 40%     Assessment: Pt noticeably improved lumbar ext following manual implying improved lumbar motion. Pt improve hip flex ROM following gluteal stretch implying tight glutes. Pt required tactile and visual cueing to keep toes forward when side stepping.       Goals: (to be met in 12 visits)  Pt will improve ext ROM to 75% in order to perform bed mobility WNL.  Pt will improve hip flex ROM to 110 degrees B to improve TUG to 18 seconds.  Pt will improve hip abd strength to 4/5 B to walk 0.1 miles w/out cane.  Pt will improve accessory motion to WNL to climb stairs w/out railing.     Plan: Contin improving core strength   Date: 11/18/2024  TX#: 2/12 Date: 11/20/24                TX#: 3/12 Date:                 TX#: 4/ Date:                 TX#: 5/ Date:   Tx#: 6/   Lumbar CPA grade 2 -12 min   Hip PROM: flex, abd, ER/IR-14 min  Nu step L4 -6 min       Standing hip flex, abd and ext RTB x 20 ea   Squats by TM RTB around knees x 30  Tandem walking x 4 laps  Lumbar CPA grade 2 - 9 min         Prone on elbows 5 x 20 sec   Supine hip flex 5# x 30 ea   Supine LTR x 40   Deadbugs x 20   Side stepping YTB in // bars x 3 laps   Tandem stance balance x 60 sec ea  Fwd/Bkwd/Lat walking no AD x 2 laps ea  Supine gluteal stretch 6 x 10 sec                HEP: Access Code: [email or web address]  URL: [email or web address]  Date: 11/12/2024  Prepared by: Kevin Shipman     Exercises  - Static Prone on Elbows  - 3 x daily - 7 x weekly - 10 reps - 60 hold  - Supine 90/90 Alternating Toe  Touch  - 3 x daily - 7 x weekly - 20 reps  - Hooklying Clamshell with Resistance  - 3 x daily - 7 x weekly - 30 reps    Charges: M-1, TE-3       Total Timed Treatment: M-9, TE-45 min  Total Treatment Time:  54 min

## 2024-11-25 ENCOUNTER — OFFICE VISIT (OUTPATIENT)
Dept: PHYSICAL THERAPY | Age: 84
End: 2024-11-25
Attending: INTERNAL MEDICINE
Payer: MEDICARE

## 2024-11-25 PROCEDURE — 97530 THERAPEUTIC ACTIVITIES: CPT

## 2024-11-25 PROCEDURE — 97110 THERAPEUTIC EXERCISES: CPT

## 2024-11-25 NOTE — PROGRESS NOTES
Diagnosis:   Gait disorder  Balance dysfunction      Referring Provider: Myrna Gardiner  Date of Evaluation:    11/12/2024     Precautions:  Hist of CA and syncope  Next MD visit:   none scheduled  Date of Surgery: n/a   Insurance Primary/Secondary: [employer] MEDICARE / N/A     # Auth Visits: 4, POC 12        Progress Summary  Pt has attended 4 visits in Physical Therapy.     Subjective: Pt reports a recent fall at home, was reaching down to grab picture from the ground, fell onto R shoulder. Pt reported mild pain in back and shoulder following the fall, but today is okay. Pt wishes to work on getting up from the ground, balance and walking.     Assessment: Pt has made small improvements to most physical impairments but overall functioning in bed mobility, balance and safety are great concerns. Pt will continue to benefit from skilled physical therapy, and if further intervention is not provided pt will continue to be at risk of fall and serious injury.      Objective:   Observation:   -Standing posture: Forward weight distribution and mildly kyphotic posture  New: min kyphotic posture.   Palpation: severe TTP vertebrae and paraspinal mm.      New: mod TTP paraspinal mm   Sensation: Light touch in tact.     AROM: (* denotes performed with pain)  Hip Knee Spine New Hip  New Knee  Spine:    Flexion: R 90; L 90  Abduction: R 20; L 20  ER: diminished B  IR: diminished B  Flexion: R 95; L 100  Extension: R 0; L 0    Flex: 75%  Ext: 25%   Rot: 50% B   SB: 75% R; 100% L  Flex: 100 R; 105 L   Abd: 25 R; 30 L   Flex: 105 R; 110 L    Flex: 85%  Ext: 35%   Rot: 75% B   SB: 100% B      Accessory motion:(* denotes pain)    Lumbar CPA: severe hypomobility*  New: mod-severe hypo    Flexibility:    New: range has improved, but flexibility can cont to improve.   Hip Flexor: + B  Hamstrings: + B   Piriformis: + B   Quads: + B       Strength/MMT: (* denotes performed with pain)  Hip Knee New Hip   Flexion: R 4-/5; L  4/5  Abduction: R 3+/5; L 4-/5  ER: R 4-/5; L 4/5  IR: R 4-/5; L 4+/5 Flexion: R 5/5; L 5/5  Extension: R 5/5; L 5/5    Flex: 4/5 R; 4+/5 L  Abd: 3+/5 R; 4-/5 L  ER: 4/5 B   IR: 4/5 B          Bed mobility:  All supervisory level of assistance, but pt was close to falling out of bed during supine to sit transition.   New: no change  -sit to Supine:  -Supine to sidelying:  -Sidelying to supine:   -Supine to sit:     Gait: pt ambulates on level ground with unilat straight can, severe med/lat sway, decreased: heel strike, push off and speed.  New: no change    Balance:   SLS: R 2 sec, L 1 sec    New: 4sec R; 2 sec L    4 stage balance:  -1: 30 sec   -2: 30 sec, mod sway  -3: 5 sec, severe sway  Fall Risk if less than 10 sec   New: 8 sec, severe sway   -4: 1 sec, severe sway                                                    New: 2 sec, severe sway      TUG: pt used unilt straight cane, 23 seconds Fall risk if less than 13 seconds     New: 21 sec       Goals: (to be met in 12 visits)  Pt will improve ext ROM to 75% in order to perform bed mobility WNL. Improved   Pt will improve hip flex ROM to 110 degrees B to improve TUG to 18 seconds. Improved   Pt will improve hip abd strength to 4/5 B to walk 0.1 miles w/out cane. No change   Pt will improve accessory motion to WNL to climb stairs w/out railing. Improved     Additional interventions:  Prone hip ext 3 x 10 B  Supine hip flex 3 x 10 B   Supine gluteal stretch w/ strap 2 x 60 sec   DB  from 6 in step x 20     Charges: TA-1, TE-2; TA-17, TE-30; Total: 47 min     Rehab Potential: good    Plan: Continue skilled Physical Therapy for a total of 7 visits over a 90 day period. Treatment will include: TE, TA, NM and Manual       Patient/Family/Caregiver was advised of these findings, precautions, and treatment options and has agreed to actively participate in planning and for this course of care.    Thank you for your referral. If you have any questions, please contact  me at Dept: [phone].    Sincerely,  Electronically signed by therapist: Hawk Shipman    Physician's certification required: Yes  Please co-sign or sign and return this letter via fax as soon as possible to [phone].   I certify the need for these services furnished under this plan of treatment and while under my care.    X___________________________________________________ Date____________________    Certification From: 11/25/2024  To:2/23/2025

## 2024-11-27 ENCOUNTER — APPOINTMENT (OUTPATIENT)
Dept: PHYSICAL THERAPY | Age: 84
End: 2024-11-27
Attending: INTERNAL MEDICINE
Payer: MEDICARE

## 2024-12-02 ENCOUNTER — APPOINTMENT (OUTPATIENT)
Dept: PHYSICAL THERAPY | Age: 84
End: 2024-12-02
Attending: INTERNAL MEDICINE
Payer: MEDICARE

## 2024-12-03 ENCOUNTER — OFFICE VISIT (OUTPATIENT)
Dept: PHYSICAL THERAPY | Age: 84
End: 2024-12-03
Attending: INTERNAL MEDICINE
Payer: MEDICARE

## 2024-12-03 PROCEDURE — 97110 THERAPEUTIC EXERCISES: CPT

## 2024-12-03 NOTE — PROGRESS NOTES
Diagnosis:   Gait disorder  Balance dysfunction        Referring Provider: Myrna Gardiner  Date of Evaluation:    11/12/24    Precautions:  Hist of CA and syncope  Next MD visit:   none scheduled  Date of Surgery: n/a   Insurance Primary/Secondary: [employer] MEDICARE / N/A     # Auth Visits: 7            Subjective: Pt reports balance has felt improved compared to last visit. Pt successfully picked up shirt from the floor, w/out a fall.     Pain: 0/10      Objective:   Tandem balance: 9 sec w/ mod sway       Assessment: Pt improved balance difficulties after practice at each activity. Pt's greatest deficit noted beyond balance was hip flexion, which resulted in poor foot clearance.         Goals: (to be met in 12 visits)  Pt will improve ext ROM to 75% in order to perform bed mobility WNL. Improved   Pt will improve hip flex ROM to 110 degrees B to improve TUG to 18 seconds. Improved   Pt will improve hip abd strength to 4/5 B to walk 0.1 miles w/out cane. No change   Pt will improve accessory motion to WNL to climb stairs w/out railing. Improved     Plan: Improve hip flex strength and standing balance.    Date: 12/3/2024  TX#: 1/7 Date:                 TX#: 3/ Date:                 TX#: 4/ Date:                 TX#: 5/ Date:   Tx#: 6/   Nu step L5 -6        Standing lunges ant and lat x 10 ea  Tandem stance holds 2 x 45 sec   Anthony step overs ant and lat x 3 laps ea  Agility ladder 2 fwd/ 1 Bkwd ant and lat x 2 laps ea  Standing hip abd and flex YTB x 30 ea                     HEP:   Access Code: [email or web address]  URL: [email or web address]  Date: 11/12/2024  Prepared by: Kevin Shipman     Exercises  - Static Prone on Elbows  - 3 x daily - 7 x weekly - 10 reps - 60 hold  - Supine 90/90 Alternating Toe Touch  - 3 x daily - 7 x weekly - 20 reps  - Hooklying Clamshell with Resistance  - 3 x daily - 7 x weekly - 30 reps    Charges: TE-3       Total Timed Treatment: TE-43 min  Total Treatment Time: 43  min

## 2024-12-04 ENCOUNTER — APPOINTMENT (OUTPATIENT)
Dept: PHYSICAL THERAPY | Age: 84
End: 2024-12-04
Attending: INTERNAL MEDICINE
Payer: MEDICARE

## 2024-12-05 ENCOUNTER — OFFICE VISIT (OUTPATIENT)
Dept: PHYSICAL THERAPY | Age: 84
End: 2024-12-05
Attending: INTERNAL MEDICINE
Payer: MEDICARE

## 2024-12-05 PROCEDURE — 97110 THERAPEUTIC EXERCISES: CPT

## 2024-12-05 NOTE — PROGRESS NOTES
Diagnosis:   Gait disorder  Balance dysfunction        Referring Provider: Myrna Gardiner  Date of Evaluation:    11/12/24    Precautions:  Hist of CA and syncope  Next MD visit:   none scheduled  Date of Surgery: n/a   Insurance Primary/Secondary: [employer] MEDICARE / N/A     # Auth Visits: 7            Subjective: Pt reports high levels of fatigue following last visit. Improved the following day.     Pain: 0/10      Objective:   Gait: Scissoring, reduced speed, high instability       Assessment: Pt's R hip flexor sig less strong than L. Pt displayed multiple LOB requiring therapist assistance, no falls.        Goals: (to be met in 12 visits)  Pt will improve ext ROM to 75% in order to perform bed mobility WNL. Improved   Pt will improve hip flex ROM to 110 degrees B to improve TUG to 18 seconds. Improved   Pt will improve hip abd strength to 4/5 B to walk 0.1 miles w/out cane. No change   Pt will improve accessory motion to WNL to climb stairs w/out railing. Improved     Plan:  Cont improving hip flexor, balance and mobility.   Date: 12/3/2024  TX#: 1/7 Date: 12/5/24                 TX#: 2/7 Date:                 TX#: 4/ Date:                 TX#: 5/ Date:   Tx#: 6/   Nu step L5 -6  Nu step L6 -8       Standing lunges ant and lat x 10 ea  Tandem stance holds 2 x 45 sec   Anthony step overs ant and lat x 3 laps ea  Agility ladder 2 fwd/ 1 Bkwd ant and lat x 2 laps ea  Standing hip abd and flex YTB x 30 ea Standing hip flex 5# x 50 B  Obstacle courses: uneven ground, AX step over, 6 in step up, trampoline walk over x 6 laps  Snow cone carries x 5 laps  Rebound-er toss ant and lat 1000g x 20 ea   Step unders in aerobic room x 6                         HEP:   Access Code: [email or web address]  URL: [email or web address]  Date: 11/12/2024  Prepared by: Kevin Shipman     Exercises  - Static Prone on Elbows  - 3 x daily - 7 x weekly - 10 reps - 60 hold  - Supine 90/90 Alternating Toe Touch  - 3 x daily - 7 x  weekly - 20 reps  - Hooklying Clamshell with Resistance  - 3 x daily - 7 x weekly - 30 reps    Charges: TE-4       Total Timed Treatment: TE-54 min  Total Treatment Time: 54 min

## 2024-12-10 ENCOUNTER — OFFICE VISIT (OUTPATIENT)
Dept: PHYSICAL THERAPY | Age: 84
End: 2024-12-10
Attending: INTERNAL MEDICINE
Payer: MEDICARE

## 2024-12-10 PROCEDURE — 97110 THERAPEUTIC EXERCISES: CPT

## 2024-12-10 NOTE — PROGRESS NOTES
Diagnosis:   Gait disorder  Balance dysfunction        Referring Provider: Myrna Gardiner  Date of Evaluation:    11/12/24    Precautions:  Hist of CA and syncope  Next MD visit:   none scheduled  Date of Surgery: n/a   Insurance Primary/Secondary: [employer]ICARE / N/A     # Auth Visits: 7            Subjective: Pt reports improved confidence in balance following last visit.     Pain: 0/10      Objective:   Gait: reduced stride length R, reduced speed overall      Assessment: Pt displayed multiple min LOB requiring therapist assistance during long AX tandem walking. Pt able to amb entire lap around FC w/out requiring sit break, but reported severe fatigue. Pt required VC to perform dead bugs correctly, also displayed severe fatigue.       Goals: (to be met in 12 visits)  Pt will improve ext ROM to 75% in order to perform bed mobility WNL. Improved   Pt will improve hip flex ROM to 110 degrees B to improve TUG to 18 seconds. Improved   Pt will improve hip abd strength to 4/5 B to walk 0.1 miles w/out cane. No change   Pt will improve accessory motion to WNL to climb stairs w/out railing. Improved     Plan: Cont improving dynamic standing balance.   Date: 12/3/2024  TX#: 1/7 Date: 12/5/24                 TX#: 2/7 Date: 12/10/24                TX#: 3/7 Date:                 TX#: 5/ Date:   Tx#: 6/   Nu step L5 -6  Nu step L6 -8  Nu step L4 -8      Standing lunges ant and lat x 10 ea  Tandem stance holds 2 x 45 sec   Anthony step overs ant and lat x 3 laps ea  Agility ladder 2 fwd/ 1 Bkwd ant and lat x 2 laps ea  Standing hip abd and flex YTB x 30 ea Standing hip flex 5# x 50 B  Obstacle courses: uneven ground, AX step over, 6 in step up, trampoline walk over x 6 laps  Snow cone carries x 5 laps  Rebound-er toss ant and lat 1000g x 20 ea   Step unders in aerobic room x 6      Supine hip flex 3# 2 x 20 B  Amb over uneven ground fwd and lat x 4 ea  Tandem walking over 3 long AX fwd and lat x 3 laps ea  Amb  around FC x 1 lap                       HEP:   Access Code: [email or web address]  URL: [email or web address]  Date: 11/12/2024  Prepared by: Kevin Shipman     Exercises  - Static Prone on Elbows  - 3 x daily - 7 x weekly - 10 reps - 60 hold  - Supine 90/90 Alternating Toe Touch  - 3 x daily - 7 x weekly - 20 reps  - Hooklying Clamshell with Resistance  - 3 x daily - 7 x weekly - 30 reps    Charges: TE-3       Total Timed Treatment: TE-42 min  Total Treatment Time: 42 min

## 2024-12-12 ENCOUNTER — OFFICE VISIT (OUTPATIENT)
Dept: PHYSICAL THERAPY | Age: 84
End: 2024-12-12
Attending: INTERNAL MEDICINE
Payer: MEDICARE

## 2024-12-12 PROCEDURE — 97110 THERAPEUTIC EXERCISES: CPT

## 2024-12-12 PROCEDURE — 97140 MANUAL THERAPY 1/> REGIONS: CPT

## 2024-12-12 NOTE — PROGRESS NOTES
Diagnosis:   Gait disorder  Balance dysfunction        Referring Provider: Myrna Gardiner  Date of Evaluation:    11/12/24    Precautions:  Hist of CA and syncope  Next MD visit:   none scheduled  Date of Surgery: n/a   Insurance Primary/Secondary: [employer]IC[employer] / N/A     # Auth Visits: 7            Subjective: Pt reports reduced balance today, walking much worse.     Pain: 0/10      Objective:   Gait: mod-severe med/lat sway       Assessment: Pt able to hold dead bugs for longer period before rest needed implying an improvement in core strength. Pt's gait deviations appeared to improve following manual therapy suggesting lumbar hypomobility may have been impacting pt's balance. Pt required tactile cueing to perform dead bugs w/ proper sequencing.        Goals: (to be met in 12 visits)  Pt will improve ext ROM to 75% in order to perform bed mobility WNL. Improved   Pt will improve hip flex ROM to 110 degrees B to improve TUG to 18 seconds. Improved   Pt will improve hip abd strength to 4/5 B to walk 0.1 miles w/out cane. No change   Pt will improve accessory motion to WNL to climb stairs w/out railing. Improved     Plan: Cont improving core strength and balance.   Date: 12/3/2024  TX#: 1/7 Date: 12/5/24                 TX#: 2/7 Date: 12/10/24                TX#: 3/7 Date: 12/12/24                TX#: 4/7 Date:   Tx#: 6/   Nu step L5 -6  Nu step L6 -8  Nu step L4 -8  Amb around FC x 2     Standing lunges ant and lat x 10 ea  Tandem stance holds 2 x 45 sec   Anthony step overs ant and lat x 3 laps ea  Agility ladder 2 fwd/ 1 Bkwd ant and lat x 2 laps ea  Standing hip abd and flex YTB x 30 ea Standing hip flex 5# x 50 B  Obstacle courses: uneven ground, AX step over, 6 in step up, trampoline walk over x 6 laps  Snow cone carries x 5 laps  Rebound-er toss ant and lat 1000g x 20 ea   Step unders in aerobic room x 6      Supine hip flex 3# 2 x 20 B  Amb over uneven ground fwd and lat x 4 ea  Tandem walking  over 3 long AX fwd and lat x 3 laps ea  Amb around FC x 1 lap     Dead bugs w/ leg lifts 6 x 5   BB taps A/P and M/L x 75 sec ea         Lumbar CPA grade 2-3 -17           HEP:   Access Code: [email or web address]  URL: [email or web address]  Date: 11/12/2024  Prepared by: Kevin Shipman     Exercises  - Static Prone on Elbows  - 3 x daily - 7 x weekly - 10 reps - 60 hold  - Supine 90/90 Alternating Toe Touch  - 3 x daily - 7 x weekly - 20 reps  - Hooklying Clamshell with Resistance  - 3 x daily - 7 x weekly - 30 reps    Charges: M-1, TE-2       Total Timed Treatment: M-17, TE-28 min  Total Treatment Time: 45 min

## 2024-12-16 ENCOUNTER — OFFICE VISIT (OUTPATIENT)
Dept: PHYSICAL THERAPY | Age: 84
End: 2024-12-16
Attending: INTERNAL MEDICINE
Payer: MEDICARE

## 2024-12-16 PROCEDURE — 97110 THERAPEUTIC EXERCISES: CPT

## 2024-12-16 NOTE — PROGRESS NOTES
Diagnosis:   Gait disorder  Balance dysfunction        Referring Provider: Myrna Gardiner  Date of Evaluation:    11/12/24    Precautions:  Hist of CA and syncope  Next MD visit:   none scheduled  Date of Surgery: n/a   Insurance Primary/Secondary: [employer]IC[employer] / N/A     # Auth Visits: 7            Subjective: Pt reports greater activity outside of therapy, walking in grocery store.     Pain: 0/10      Objective:   Core strength: improved duration and resistance during dead bugs      Assessment: Pt cont to improve core strength. Pt required minor assistance from therapist to avoid fall during standing balance EX, but pt able to catch own balance more effectively than last visit.       Goals: (to be met in 12 visits)  Pt will improve ext ROM to 75% in order to perform bed mobility WNL. Improved   Pt will improve hip flex ROM to 110 degrees B to improve TUG to 18 seconds. Improved   Pt will improve hip abd strength to 4/5 B to walk 0.1 miles w/out cane. No change   Pt will improve accessory motion to WNL to climb stairs w/out railing. Improved     Plan: Improving dynamic balance, add bird-dogs   Date: 12/3/2024  TX#: 1/7 Date: 12/5/24                 TX#: 2/7 Date: 12/10/24                TX#: 3/7 Date: 12/12/24                TX#: 4/7 Date: 12/16/24  Tx#: 5/7   Nu step L5 -6  Nu step L6 -8  Nu step L4 -8  Amb around FC x 2  Nu step L3 -4    Standing lunges ant and lat x 10 ea  Tandem stance holds 2 x 45 sec   Anthony step overs ant and lat x 3 laps ea  Agility ladder 2 fwd/ 1 Bkwd ant and lat x 2 laps ea  Standing hip abd and flex YTB x 30 ea Standing hip flex 5# x 50 B  Obstacle courses: uneven ground, AX step over, 6 in step up, trampoline walk over x 6 laps  Snow cone carries x 5 laps  Rebound-er toss ant and lat 1000g x 20 ea   Step unders in aerobic room x 6      Supine hip flex 3# 2 x 20 B  Amb over uneven ground fwd and lat x 4 ea  Tandem walking over 3 long AX fwd and lat x 3 laps ea  Amb around  FC x 1 lap     Dead bugs w/ leg lifts 6 x 5   BB taps A/P and M/L x 75 sec ea   Dead bugs w/ leg holds 3# ankle and wrists 5 x 8   Tandem walking over 3 long AX fwd and lat x 3 laps ea  Anthony step overs ant and lat x 3 ea  Stair negotiation x 2  Laps around FC x 2         Lumbar CPA grade 2-3 -17           HEP:   Access Code: [email or web address]  URL: [email or web address]  Date: 11/12/2024  Prepared by: Kevin Shipman     Exercises  - Static Prone on Elbows  - 3 x daily - 7 x weekly - 10 reps - 60 hold  - Supine 90/90 Alternating Toe Touch  - 3 x daily - 7 x weekly - 20 reps  - Hooklying Clamshell with Resistance  - 3 x daily - 7 x weekly - 30 reps    Charges: TE-4      Total Timed Treatment:  TE-55 min  Total Treatment Time: 55 min

## 2024-12-18 ENCOUNTER — OFFICE VISIT (OUTPATIENT)
Dept: PHYSICAL THERAPY | Age: 84
End: 2024-12-18
Attending: INTERNAL MEDICINE
Payer: MEDICARE

## 2024-12-18 PROCEDURE — 97140 MANUAL THERAPY 1/> REGIONS: CPT

## 2024-12-18 PROCEDURE — 97110 THERAPEUTIC EXERCISES: CPT

## 2024-12-18 NOTE — PROGRESS NOTES
Diagnosis:   Gait disorder  Balance dysfunction        Referring Provider: Myrna Gardiner  Date of Evaluation:    11/12/24    Precautions:  Hist of CA and syncope  Next MD visit:   none scheduled  Date of Surgery: n/a   Insurance Primary/Secondary: [employer] MEDICARE / N/A     # Auth Visits: 7            Subjective: Pt reports no new changes in sxs.     Pain: 0/10      Objective:   Core strength: held position for > 20 sec      Assessment: Pt has made noticeable improvements in mobility, core strength and dynamic balance. Pt will likely continue to be advised to use his cane in crowded public areas, but chances of fall appear to be far less.       Goals: (to be met in 12 visits)  Pt will improve ext ROM to 75% in order to perform bed mobility WNL. Improved   Pt will improve hip flex ROM to 110 degrees B to improve TUG to 18 seconds. Improved   Pt will improve hip abd strength to 4/5 B to walk 0.1 miles w/out cane. No change   Pt will improve accessory motion to WNL to climb stairs w/out railing. Improved     Plan: D/C   Date: 12/12/24                TX#: 4/7 Date: 12/16/24  Tx#: 5/7 12/18/2024  6/7   Amb around FC x 2  Nu step L3 -4  Nu step L4 -6    Dead bugs w/ leg lifts 6 x 5   BB taps A/P and M/L x 75 sec ea   Dead bugs w/ leg holds 3# ankle and wrists 5 x 8   Tandem walking over 3 long AX fwd and lat x 3 laps ea  Anthony step overs ant and lat x 3 ea  Stair negotiation x 2  Laps around FC x 2    PROM: flex, abd, add, abd, ext, ER, IR -12     Lumbar CPA grade 2-3 -17  Dead bug raises 5# db and 5# ankle weights x 20 total  Stepping over yoga blocks ant and lat  Stair negotiation x 2  Laps around FC x 2           HEP:   Access Code: [email or web address]  URL: [email or web address]  Date: 11/12/2024  Prepared by: Kevin Shipman     Exercises  - Static Prone on Elbows  - 3 x daily - 7 x weekly - 10 reps - 60 hold  - Supine 90/90 Alternating Toe Touch  - 3 x daily - 7 x weekly - 20 reps  - Hooklying  Chandul with Resistance  - 3 x daily - 7 x weekly - 30 reps    Charges: M-1, TE-3     Total Timed Treatment:  M-12, TE-43 min  Total Treatment Time: 55 min

## 2024-12-23 ENCOUNTER — OFFICE VISIT (OUTPATIENT)
Dept: PHYSICAL THERAPY | Age: 84
End: 2024-12-23
Attending: INTERNAL MEDICINE
Payer: MEDICARE

## 2024-12-23 PROCEDURE — 97530 THERAPEUTIC ACTIVITIES: CPT

## 2024-12-23 NOTE — PROGRESS NOTES
Diagnosis:   Gait disorder  Balance dysfunction        Referring Provider: Myrna Gardiner  Date of Evaluation:    11/12/24    Precautions:  Hist of CA and syncope  Next MD visit:   none scheduled  Date of Surgery: n/a   Insurance Primary/Secondary: [employer] MEDICARE / N/A     # Auth Visits: 7           Discharge Summary  Pt has attended 12 visits in Physical Therapy.     Subjective: Pt reports balance continues to be greatest concern. Maneuvering in bed has become less restricted.      Assessment: Pt has made improvements to LE strength and flexibility, along with bed mobility. However, the pt continues to be a high fall risk, he has been instructed to continue using the cane and on safety strategies during ADLs. Pt demonstrated adequate understanding of all safety recommendations and HEP, and can be safely discharged from therapy.     Objective:   Observation:   -Standing posture: Forward weight distribution and mildly kyphotic posture  New: Min kyphotic   Palpation: severe TTP vertebrae and paraspinal mm.      New: no TTP paraspinal mm     AROM: (* denotes performed with pain)  Hip Knee Spine New Hip  New Knee  Spine:    Flexion: R 90; L 90  Abduction: R 20; L 20  ER: diminished B  IR: diminished B  Flexion: R 95; L 100  Extension: R 0; L 0    Flex: 75%  Ext: 25%   Rot: 50% B   SB: 75% R; 100% L  Flex: 105 R; 105 L   Abd: 30 R; 30 L   Flex: 110 R; 110 L    Flex: 85%  Ext: 35%   Rot: 75% B   SB: 100% B      Spinal EXT: 75%  Spinal Flex: 90%    Accessory motion:(* denotes pain)    Lumbar CPA: severe hypomobility*  New: mod hypo    Flexibility:    New: improved but still all positive    Hip Flexor: + B  Hamstrings: + B   Piriformis: + B   Quads: + B       Strength/MMT: (* denotes performed with pain)  Hip Knee New Hip   Flexion: R 4-/5; L 4/5  Abduction: R 3+/5; L 4-/5  ER: R 4-/5; L 4/5  IR: R 4-/5; L 4+/5 Flexion: R 5/5; L 5/5  Extension: R 5/5; L 5/5    Flex: 4/5 R; 4+/5 L  Abd: 4/5 R; 4/5 L  ER: 5/5 B    IR: 5/5 B          Bed mobility:  All supervisory level of assistance, but pt was close to falling out of bed during supine to sit transition. New: Independent  -sit to Supine:  -Supine to sidelying:  -Sidelying to supine:   -Supine to sit:     Gait: pt ambulates on level ground with unilat straight can, severe med/lat sway, decreased: heel strike, push off and speed.  New: R foot drag, mod trendelenburg, reduced speed.     Balance:   SLS: R 2 sec, L 1 sec    New: 6 sec R; 3 sec L     4 stage balance:  -1: 30 sec   -2: 30 sec, mod sway  -3: 5 sec, severe sway  Fall Risk if less than 10 sec   New: 15 sec, mod sway   -4: 1 sec, severe sway                                                    New: 2 sec, severe sway      TUG: pt used unilt straight cane, 23 seconds Fall risk if less than 13 seconds     New: 19 sec     Post LEFS: 52.5     Goals: (to be met in 12 visits)  Pt will improve ext ROM to 75% in order to perform bed mobility WNL. MET  Pt will improve hip flex ROM to 110 degrees B to improve TUG to 18 seconds. Improved   Pt will improve hip abd strength to 4/5 B to walk 0.1 miles w/out cane. Improved   Pt will improve accessory motion to WNL to climb stairs w/out railing. Improved        Patient/Family/Caregiver was advised of these findings, precautions, and treatment options and has agreed to actively participate in planning and for this course of care.    Thank you for your referral. If you have any questions, please contact me at Dept: [phone].    Sincerely,  Electronically signed by therapist: Hawk Shipman    Physician's certification required: No  Please co-sign or sign and return this letter via fax as soon as possible to [phone].   I certify the need for these services furnished under this plan of treatment and while under my care.    X___________________________________________________ Date____________________    Certification From: 12/23/2024  To:3/23/2025

## 2025-01-17 NOTE — PROGRESS NOTES
Diagnosis:   S/P cervical spinal fusion (Z98.1)         Referring Provider: Myrna Gardiner  Date of Evaluation:    07/24/2024     Precautions:  Hist of CA Next MD visit:   none scheduled  Date of Surgery: n/a   Insurance Primary/Secondary: [employer] MEDICARE / N/A     # Auth Visits: 8            Subjective: Pt reports the back and neck mm on R side felt better for a couple of days following last session, but feeling more sore today.     Pain: 1-2/10      Objective:   Thoracic CPA: mod-mild hypo     Assessment: Pt continues improve thoracic mobility and interscapular strength. Pt is slowly improving cerv AROM.        Goals:   Pt will improve cervical AROM flexion by 15 degrees to improve tolerance for looking down to tie shoes in 3 weeks.   Pt will improve cervical AROM extension by 15 degrees to improve tolerance for putting dishes into overhead cabinets in 3 weeks.    Pt will improve cervical AROM rotation to >35 degrees to improve tolerance for turning head to check blind spot while driving 6 weeks.   Pt will have improved sitting posture to WNL to look over shoulder while driving in 6 weeks.    Plan: Assess response to PT asst thoracic Ext.    Date: 9/30/2024  TX#: 1/8 Date: 10/7/24                TX#: 2/8 Date:10/10/24                 TX#: 3/8 Date:10/14/24                 TX#: 4/8 Date:   Tx#: 6/   UBE-4 min   UBE 1/1  UBE 3/3    Thoracic CPA garde 1-25 min STM thoracic back mm-24 min   Thoracic CPA grade2-3 -13 min  STM interscapular mm-13 min Thoracic CPA grade2-3 -11 min  STM interscapular mm-13 min    SL open book x 15 ea  Supine thoracic ext 5# 3 x 60 sec   Assessment of cerv AROM  Seated horiz abd GTB 2 x 15   Towel Asst cerv flex 2 x 30 sec  Towel Asst cerv ext 2 x 30 sec  Seated cable rows 40# 3 x 20   Seated thoracic ext FR x 30  Seated thoracic rot DR x 40  Towel Asst UT stretch 2 x 90 sec ea Prone horiz abd 2# 2 x 15 ea  Seated cable row 45# 3 x 15   Supine thoracic ext 3# 3 x 30 sec   Seated  VASCULAR SURGERY DISCHARGE INSTRUCTIONS    Woundcare:  - Take your incision dressing off 2 days after your surgery and gently rinse your incision with soap and water daily. Pad the incision dry afterward  - When resting or sleeping, try to keep your arm elevated to shoulder level on pillows to reduce swelling  - If you notice clear drainage from your incision, you can apply dry gauze daily and secure in place with tape or gentle elastic wrap    Activity:  - Avoid prolonged exertion of the affected arm  - Avoid keeping your arm down below your chest for prolonged periods of time (this could lead to increased swelling)  - No heavy lifting with the affected arm  - Sleep with your arm elevated on pillows at night to reduce swelling  - No swimming in pools, [employer], [employer] etc. for 6 weeks after your surgery    Diet:  -Resume your pre-operative home diet    Follow up:  -Please follow up in clinic in 2-4 weeks.    Call Vascular Surgery Office at [phone] if you experience:  -Increased redness, warmth, tenderness, or draining pus at your incisions  -Worsening fevers, chills, nausea/vomiting  -Pain, weakness, coldness, or numbness in your hand  -Uncontrolled pain  -Your call will be returned within 24 hours and further instructions will be provided    Go to ER/Urgent Care if you experience:  -Worsening shortness of breath or chest pain    cable row 50# 3 x 20   Prone horiz abd 4# 2 x 15 ea, 1# x 30 ea    Seated thoracic ext w/ PT asst x 15              HEP:   Access Code: [email or web address]  URL: [email or web address]  Date: 07/24/2024  Prepared by: Kevin Shipman     Exercises  - Supine Cervical Rotation AROM on Pillow  - 1 x daily - 7 x weekly - 3 sets - 10 reps  - Supine Cervical Sidebending  - 1 x daily - 7 x weekly - 3 sets - 10 reps  - Supine Chin Tuck  - 1 x daily - 7 x weekly - 3 sets - 10 reps    Charges: M-2, TE       Total Timed Treatment: M-24, TE-18 min  Total Treatment Time: 42 min

## 2025-04-30 ENCOUNTER — OFFICE VISIT (OUTPATIENT)
Facility: LOCATION | Age: 85
End: 2025-04-30
Payer: COMMERCIAL

## 2025-04-30 DIAGNOSIS — H93.293 ABNORMAL AUDITORY PERCEPTION OF BOTH EARS: Primary | ICD-10-CM

## 2025-04-30 DIAGNOSIS — H90.3 ASYMMETRIC SNHL (SENSORINEURAL HEARING LOSS): Primary | ICD-10-CM

## 2025-04-30 PROCEDURE — 99213 OFFICE O/P EST LOW 20 MIN: CPT | Performed by: OTOLARYNGOLOGY

## 2025-04-30 PROCEDURE — 1160F RVW MEDS BY RX/DR IN RCRD: CPT | Performed by: OTOLARYNGOLOGY

## 2025-04-30 PROCEDURE — 1159F MED LIST DOCD IN RCRD: CPT | Performed by: OTOLARYNGOLOGY

## 2025-04-30 PROCEDURE — 92567 TYMPANOMETRY: CPT | Performed by: AUDIOLOGIST

## 2025-04-30 PROCEDURE — 92557 COMPREHENSIVE HEARING TEST: CPT | Performed by: AUDIOLOGIST

## 2025-04-30 RX ORDER — FLUTICASONE PROPIONATE 50 MCG
2 SPRAY, SUSPENSION (ML) NASAL DAILY
Qty: 16 G | Refills: 3 | Status: SHIPPED | OUTPATIENT
Start: 2025-04-30

## 2025-04-30 NOTE — PROGRESS NOTES
Aroldo Greco was seen for an audiometric evaluation and tympanogram today. Referred back to physician due to asymmetrical hearing loss (right ear).  Consider trial use of amplification pending medical clearance.      Sherrie Hager M.A. [facility]

## 2025-04-30 NOTE — PROGRESS NOTES
Aroldo Greco is a 84 year old male. No chief complaint on file.    HPI:   He has a history of sudden hearing loss of the right ear 10 years ago.  Ever since then he has had hearing issues in that ear.  He denies ear infections or pain.    REVIEW OF SYSTEMS:   GENERAL HEALTH: feels well otherwise  GENERAL : denies fever, chills, sweats, weight loss, weight gain  SKIN: denies any unusual skin lesions or rashes  RESPIRATORY: denies shortness of breath with exertion  NEURO: denies headaches    EXAM:   There were no vitals taken for this visit.    System Findings Details   Constitutional  Overall appearance - Normal.   Psychiatric  Orientation - Oriented to time, place, person & situation. Appropriate mood and affect.   Head/Face  Facial features -- Normal. Skull - Normal.   Eyes  Pupils equal ,round ,react to light and accomidate   Ears, Nose, Throat, Neck  There is some fluid on the right ear the left ear is clear oropharynx clear neck no masses   Neurological  Memory - Normal. Cranial nerves - Cranial nerves II through XII grossly intact.   Lymph Detail  Submental. Submandibular. Anterior cervical. Posterior cervical. Supraclavicular.     Latest Audiogram Result (Hz) Exam performed: 4/30/2025 9:57 AM Last edited by Sherrie Hager AUD on 4/30/2025 10:23 AM        125 250  1500 2000 3000 4000 6000 8000    Right air:  50 60 65  80 90 95 95 100N 95N    Left air:  25 25  35 45 55 65 75 90 95    Right air (masked):     95          Left mastoid bone:   15  35  50  70      Right mastoid bone (masked):   25  50  80  75         Reliability:  Good    Transducer:  Inserts    Technique:  Conventional Audiometry    Comments:  Masked BC SRT for the right ear = 50dB           Latest Speech Audiometry  Last edited by Sherrie Hager AUD on 4/30/2025 10:20 AM       Ear Method PTA SAT SRT [facility] Test/list Score (%) Intensity Mask/noise Notes    right live voice   70 90  W-22 64 90 70     left live voice   65  65  W-22 96 80                    Latest Tympanogram Result       Probe Tone (Hz): Unknown Exam performed: 4/30/2025 9:57 AM Last edited by Sherrie Hager AUD on 4/30/2025 10:23 AM      Tympanograms  These were drawn by a user, not generated from device data      Right Ear Left Ear                     Right Ear Left Ear    Tympanogram type: Type B Type As    Canal volume (mL): 1 1.23    Peak pressure (daPa):  -135    Peak amplitude (mL):  0.17    Tympanogram width (daPa):        Comments:                    Latest Audiogram and Tympanogram Result Text  Last edited by Sherrie Hager AUD on 4/30/2025 10:23 AM      Study Result                 Narrative & Impression  Aroldo Greco was seen today for an audiological evaluation.  Patient reports longstanding sudden hearing loss in right ear.  Had recent test at [facility] which supports asymmetrical hearing loss (right ear).  Patient trialed hearing aids at [facility].  Patient denied ear pain and aural pressure.  Longstanding tinnitus also noted in both ears.       Audiogram:    Right Ear= moderate to profound mixed hearing loss, 250-8000Hz.   Left Ear= mild sloping to severe sensorineural hearing loss, 250-8000Hz.     Word recognition ability in quiet:   Right ear= fair, 64%  Left ear= excellent, 96%    Tympanograms:   Right ear: Type B- no measurable pressure peak with normal ear canal volume measures.   Left ear: Type As- negative middle ear pressure with limited tympanic membrane mobility.      RECS: Follow-up with ENT for asymmetrical hearing loss.  Consider trial use with amplification pending medical clearance.     Sherrie Hager M.A. CCC-A  Audiologist [phone]                   ASSESSMENT AND PLAN:   1. Asymmetric SNHL (sensorineural hearing loss)  Audiogram shows a mixed hearing loss in the right ear with some fluid on physical examination.  He is medically cleared for hearing aids.  He will try Flonase.  He will then see me back in follow-up.   If symptoms persist he may require an ear tube.      The patient indicates understanding of these issues and agrees to the plan.    No follow-ups on file.    Jose Lowe MD  4/30/2025  12:34 PM

## 2025-08-13 ENCOUNTER — OFFICE VISIT (OUTPATIENT)
Facility: LOCATION | Age: 85
End: 2025-08-13

## 2025-08-13 DIAGNOSIS — H65.21 RIGHT CHRONIC SEROUS OTITIS MEDIA: Primary | ICD-10-CM

## 2025-08-13 DIAGNOSIS — H93.293 ABNORMAL AUDITORY PERCEPTION OF BOTH EARS: Primary | ICD-10-CM

## 2025-08-13 PROCEDURE — 99213 OFFICE O/P EST LOW 20 MIN: CPT | Performed by: OTOLARYNGOLOGY

## 2025-08-13 PROCEDURE — 92567 TYMPANOMETRY: CPT | Performed by: AUDIOLOGIST

## 2025-08-13 PROCEDURE — 92557 COMPREHENSIVE HEARING TEST: CPT | Performed by: AUDIOLOGIST

## 2025-08-13 PROCEDURE — 1160F RVW MEDS BY RX/DR IN RCRD: CPT | Performed by: OTOLARYNGOLOGY

## 2025-08-13 PROCEDURE — 1159F MED LIST DOCD IN RCRD: CPT | Performed by: OTOLARYNGOLOGY

## 2025-08-14 ENCOUNTER — TELEPHONE (OUTPATIENT)
Facility: LOCATION | Age: 85
End: 2025-08-14

## 2025-08-15 ENCOUNTER — OFFICE VISIT (OUTPATIENT)
Facility: LOCATION | Age: 85
End: 2025-08-15

## 2025-08-15 DIAGNOSIS — H65.21 RIGHT CHRONIC SEROUS OTITIS MEDIA: Primary | ICD-10-CM

## (undated) DEVICE — STERILE POLYISOPRENE POWDER-FREE SURGICAL GLOVES: Brand: PROTEXIS

## (undated) DEVICE — SUTURE VICRYL 0 CT-1

## (undated) DEVICE — GOWN,SIRUS,FABRIC-REINFORCED,X-LARGE: Brand: MEDLINE

## (undated) DEVICE — SOLUTION SURG DURA PREP HAZMAT

## (undated) DEVICE — #15 STERILE STAINLESS BLADE: Brand: STERILE STAINLESS BLADES

## (undated) DEVICE — PAD SACRAL SPAN AID

## (undated) DEVICE — 5.0MM X 7.0MM FLUTED DRUM

## (undated) DEVICE — 3M(TM) TEGADERM(TM) TRANSPARENT FILM DRESSING FRAME STYLE 1628: Brand: 3M™ TEGADERM™

## (undated) DEVICE — TZ MEDICAL TITANIUM DISTRACTION PINS 14MM: Brand: TZ MEDICAL TITANIUM DISTRACTION PINS

## (undated) DEVICE — SOL  .9 3000ML

## (undated) DEVICE — REM POLYHESIVE ADULT PATIENT RETURN ELECTRODE: Brand: VALLEYLAB

## (undated) DEVICE — 1071 S-DRP URO STLE-GAMA 10/BX,4X/C: Brand: STERI-DRAPE™

## (undated) DEVICE — TRANSPOSAL ULTRAFLEX DUO/QUAD ULTRA CART MANIFOLD

## (undated) DEVICE — HF-RESECTION ELECTRODE PLASMALOOP LOOP, LARGE, 24 FR., 12°/16°, ESG TURIS: Brand: OLYMPUS

## (undated) DEVICE — GAUZE SPONGES,12 PLY: Brand: CURITY

## (undated) DEVICE — KENDALL SCD EXPRESS SLEEVES, KNEE LENGTH, MEDIUM: Brand: KENDALL SCD

## (undated) DEVICE — DRAPE TABLE COVER 44X90 TC-10

## (undated) DEVICE — C-ARM: Brand: UNBRANDED

## (undated) DEVICE — DRAPE C-ARM UNIVERSAL

## (undated) DEVICE — SYRINGE 30ML LL TIP

## (undated) DEVICE — SUTURE VICRYL 3-0 SH

## (undated) DEVICE — 3.0MM PRECISION NEURO (MATCH HEAD)

## (undated) DEVICE — 3M™ DURAPORE™ SURGICAL TAPE 1538-1, 1 INCH X 10 YARD (2,5CM X 9,1M), 12 ROLLS/BOX: Brand: 3M™ DURAPORE™

## (undated) DEVICE — EVAC URL LDSEN DF4 IBIR 64CM

## (undated) DEVICE — LIGHT HANDLE

## (undated) DEVICE — URINE DRAINAGE BAG,BAG, NEEDLE SAMPLING, DRAIN TUBE: Brand: DOVER

## (undated) DEVICE — LAMINECTOMY CDS: Brand: MEDLINE INDUSTRIES, INC.

## (undated) DEVICE — SUTURE VICRYL 3-0 RB-1

## (undated) DEVICE — CAUTERY BLADE 2IN INS E1455

## (undated) DEVICE — PLASTC TOOMEY SYRNG DISP

## (undated) DEVICE — SOL  .9 1000ML BTL

## (undated) DEVICE — DRAPE,THYROID,SOFT,STERILE: Brand: MEDLINE

## (undated) DEVICE — 5.0MM ROUND FLUTED SOFT TOUCH

## (undated) DEVICE — DERMABOND LIQUID ADHESIVE

## (undated) DEVICE — 11.1-MULTIMODALITY IOM KIT

## (undated) DEVICE — Device

## (undated) DEVICE — 3M(TM) MEDIPORE(TM) +PAD SOFT CLOTH ADHESIVE WOUND DRESSING 3566: Brand: 3M™ MEDIPORE™

## (undated) DEVICE — PREMIUM WET SKIN PREP TRAY: Brand: MEDLINE INDUSTRIES, INC.

## (undated) DEVICE — CYSTO CDS-LF: Brand: MEDLINE INDUSTRIES, INC.

## (undated) DEVICE — SYRINGE,TOOMEY,IRRIGATION,70CC,STERILE: Brand: MEDLINE

## (undated) DEVICE — UNIPLATE ANTERIOR CERVICAL PLATE SYSTEM DRILL 14MM: Brand: UNIPLATE

## (undated) DEVICE — DRILL SRG OIL CRTDG MAESTRO

## (undated) NOTE — LETTER
09/01/20             Jesu Lopez             07-  Dr De Luna Failing and staff,    We have received the office note from patient's visit on 8-19. The plan was to have Atul First follow up with cardiology to review his recent abnormal EKG.   He has gotten clear

## (undated) NOTE — LETTER
08/04/20    Vikki Richard   11/28/1940    You are scheduled for 2 level ACDF on 8/20/20 with Dr. Atul Bonilla.     Pre-op instructions discussed with patient and surgical packet provided:    · You will need to contact the Pre-admission department at [phone] Shower with this daily for the five days before surgery, using the entire bottle over the five days.      Steps to bathing with Hibiclens  Do not use Hibiclens on your hair, face or private areas  Wash your hair and face as normal with your usual cleansers from the hospital.    · Post operative appointment to be made 2 weeks after surgery. A virtual spine class is available online at [email or web address]. Please call the Care Coordinator, Kasia Pineda, with any questions, at [phone].

## (undated) NOTE — LETTER
Patient Name: Aroldo Greco  YOB: 1940          MRN :  [id number]  Date:  9/5/2024  Referring Physician:  Marbella Damon                 21st Century Cures Act Notice to Patient: Medical documents like this are made available to patients in the interest of transparency. However, be advised this is a medical document and it is intended as dcve-np-qyor communication between your medical providers. This medical document may contain abbreviations, assessments, medical data, and results or other terms that are unfamiliar. Medical documents are intended to carry relevant information, facts as evident, and the clinical opinion of the practitioner. As such, this medical document may be written in language that appears blunt or direct. You are encouraged to contact your medical provider and/or [facility] if you have any questions about this medical document.

## (undated) NOTE — LETTER
20    Dear Viki Kang,    [facility] received the stress test results for patient Marleny Malone ( 74-).   We will need a note stating whether or not he has been given cardiac clearance from the physician's perspective in regards to the test resu

## (undated) NOTE — LETTER
[facility]  Phone: [phone]  Right Fax: [phone]  hospitals 20 Vero Gomez RN Date: 8/10/20    Patient Name: Blank Eduardo  Surgery Date: 8/20/2020    CSN: [id number]  Medical Record: [id number]

## (undated) NOTE — LETTER
Dear Patient,  Please note that [facility] (“ENT”) does not sell hearing aid devices/supplies.  The audiologists that are overseeing your care today are employed at [facility] as well at [employer].  [employer] is an independent audiology company that sells hearing aid devices and supplies.  The company is not owned or affiliated with [facility].  [employer] is located at:  [email or web address] [facility]  [address], [address]  [place], [place] [address] [phone]  Your audiologist is recommending that you could benefit from a hearing aid device and/or supplies.  If you decide to purchase a hearing aid device or supplies from [employer], the audiologists will receive a financial benefit from this sale.    As a patient and a consumer, you have the option to do your own research to find the most appropriate audiology vendor.  There are local and national audiology vendors that sell comparable products and services.  In addition to [employer], we have provided you with a preliminary list of audiology vendors that will allow you to start the due diligence process.   [employer]  [address], [address]  [place], [place] [address] [phone]  [facility]  [address], [address]  [place], [place] [id number] [phone]  [employer] [facility]  [address], [address]  [place], [place] [address] [phone]  *Accepts Public Aid  [facility]  [address]  [place], [place], [address]  [phone]    Please note that your choice of an audiology vendor will not affect the relationship you have with your audiologist in any capacity.

## (undated) NOTE — MR AVS SNAPSHOT
After Visit Summary   5/21/2020    Vikki Richard    MRN: [id number]           Visit Information     Date & Time  5/21/2020  1:00 PM Provider  Carl Ruiz MD Department  [address] [place] [address] Dept.  Phone  [phone]      Allergies as o 9. Click Sign In. You can now view your medical record. Additional Information  If you have questions, you can call [phone] to talk to our [address] [facility] staff. Remember, [employer] is NOT to be used for urgent needs. For medical emergencies, dial 911. conditions such as allergies, colds, cough, fever, rash, sore throat, headache and pink eye. The cost for a Video Visit is currently $35.         If you receive a survey from [employer], please take a few minutes to complete it and provide feedback.  We s Conditions needing urgent attention, but are   non-life-threatening. Also available by appointment Average cost  $120*     EMERGENCY ROOM Life-threatening emergencies needing immediate intervention at a hospital emergency room.  Average cost  $2,300*   *

## (undated) NOTE — LETTER
20      RE: Dunia Martinez     : 1940    Dear Dr. Toy Whalen,    This letter is to inform you that your patient is being scheduled for surgery with Dr. Radha Lacey on 20 at [facility]. We have asked the patient to contact your office

## (undated) NOTE — LETTER
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  Please see attached clearance letter and advise if he is c